# Patient Record
Sex: MALE | Race: WHITE | NOT HISPANIC OR LATINO | Employment: UNEMPLOYED | URBAN - METROPOLITAN AREA
[De-identification: names, ages, dates, MRNs, and addresses within clinical notes are randomized per-mention and may not be internally consistent; named-entity substitution may affect disease eponyms.]

---

## 2019-04-07 ENCOUNTER — HOSPITAL ENCOUNTER (EMERGENCY)
Facility: HOSPITAL | Age: 44
Discharge: HOME | End: 2019-04-08
Attending: EMERGENCY MEDICINE | Admitting: EMERGENCY MEDICINE
Payer: COMMERCIAL

## 2019-04-07 DIAGNOSIS — F41.9 ANXIETY: ICD-10-CM

## 2019-04-07 DIAGNOSIS — F22 PARANOID (HCC): Primary | ICD-10-CM

## 2019-04-07 DIAGNOSIS — F15.10 METHAMPHETAMINE ABUSE (HCC): ICD-10-CM

## 2019-04-07 PROBLEM — F20.9 SCHIZOPHRENIA (HCC): Status: ACTIVE | Noted: 2019-04-07

## 2019-04-07 LAB
ALBUMIN SERPL BCP-MCNC: 3.6 G/DL (ref 3.5–5)
ALP SERPL-CCNC: 77 U/L (ref 46–116)
ALT SERPL W P-5'-P-CCNC: 19 U/L (ref 12–78)
ANION GAP SERPL CALCULATED.3IONS-SCNC: 12 MMOL/L (ref 4–13)
AST SERPL W P-5'-P-CCNC: 24 U/L (ref 5–45)
BASOPHILS # BLD AUTO: 0.05 THOUSANDS/ΜL (ref 0–0.1)
BASOPHILS NFR BLD AUTO: 1 % (ref 0–1)
BILIRUB DIRECT SERPL-MCNC: 0.1 MG/DL (ref 0–0.2)
BILIRUB SERPL-MCNC: 0.6 MG/DL (ref 0.2–1)
BUN SERPL-MCNC: 18 MG/DL (ref 5–25)
CALCIUM SERPL-MCNC: 8.6 MG/DL (ref 8.3–10.1)
CHLORIDE SERPL-SCNC: 104 MMOL/L (ref 100–108)
CO2 SERPL-SCNC: 26 MMOL/L (ref 21–32)
CREAT SERPL-MCNC: 0.93 MG/DL (ref 0.6–1.3)
EOSINOPHIL # BLD AUTO: 0.02 THOUSAND/ΜL (ref 0–0.61)
EOSINOPHIL NFR BLD AUTO: 0 % (ref 0–6)
ERYTHROCYTE [DISTWIDTH] IN BLOOD BY AUTOMATED COUNT: 13.4 % (ref 11.6–15.1)
ETHANOL SERPL-MCNC: <3 MG/DL (ref 0–3)
GFR SERPL CREATININE-BSD FRML MDRD: 99 ML/MIN/1.73SQ M
GLUCOSE SERPL-MCNC: 107 MG/DL (ref 65–140)
HCT VFR BLD AUTO: 48.1 % (ref 36.5–49.3)
HGB BLD-MCNC: 15.8 G/DL (ref 12–17)
IMM GRANULOCYTES # BLD AUTO: 0.01 THOUSAND/UL (ref 0–0.2)
IMM GRANULOCYTES NFR BLD AUTO: 0 % (ref 0–2)
LYMPHOCYTES # BLD AUTO: 1.34 THOUSANDS/ΜL (ref 0.6–4.47)
LYMPHOCYTES NFR BLD AUTO: 19 % (ref 14–44)
MAGNESIUM SERPL-MCNC: 1.8 MG/DL (ref 1.6–2.6)
MCH RBC QN AUTO: 30.3 PG (ref 26.8–34.3)
MCHC RBC AUTO-ENTMCNC: 32.8 G/DL (ref 31.4–37.4)
MCV RBC AUTO: 92 FL (ref 82–98)
MONOCYTES # BLD AUTO: 0.82 THOUSAND/ΜL (ref 0.17–1.22)
MONOCYTES NFR BLD AUTO: 12 % (ref 4–12)
NEUTROPHILS # BLD AUTO: 4.74 THOUSANDS/ΜL (ref 1.85–7.62)
NEUTS SEG NFR BLD AUTO: 68 % (ref 43–75)
NRBC BLD AUTO-RTO: 0 /100 WBCS
PLATELET # BLD AUTO: 313 THOUSANDS/UL (ref 149–390)
PMV BLD AUTO: 10.5 FL (ref 8.9–12.7)
POTASSIUM SERPL-SCNC: 3.5 MMOL/L (ref 3.5–5.3)
PROT SERPL-MCNC: 7.4 G/DL (ref 6.4–8.2)
RBC # BLD AUTO: 5.22 MILLION/UL (ref 3.88–5.62)
SODIUM SERPL-SCNC: 142 MMOL/L (ref 136–145)
TROPONIN I SERPL-MCNC: <0.02 NG/ML
WBC # BLD AUTO: 6.98 THOUSAND/UL (ref 4.31–10.16)

## 2019-04-07 PROCEDURE — 84484 ASSAY OF TROPONIN QUANT: CPT | Performed by: EMERGENCY MEDICINE

## 2019-04-07 PROCEDURE — 80076 HEPATIC FUNCTION PANEL: CPT | Performed by: EMERGENCY MEDICINE

## 2019-04-07 PROCEDURE — 83735 ASSAY OF MAGNESIUM: CPT | Performed by: EMERGENCY MEDICINE

## 2019-04-07 PROCEDURE — 96374 THER/PROPH/DIAG INJ IV PUSH: CPT

## 2019-04-07 PROCEDURE — 80320 DRUG SCREEN QUANTALCOHOLS: CPT | Performed by: EMERGENCY MEDICINE

## 2019-04-07 PROCEDURE — 96375 TX/PRO/DX INJ NEW DRUG ADDON: CPT

## 2019-04-07 PROCEDURE — 99285 EMERGENCY DEPT VISIT HI MDM: CPT

## 2019-04-07 PROCEDURE — 96361 HYDRATE IV INFUSION ADD-ON: CPT

## 2019-04-07 PROCEDURE — 36415 COLL VENOUS BLD VENIPUNCTURE: CPT | Performed by: EMERGENCY MEDICINE

## 2019-04-07 PROCEDURE — 85025 COMPLETE CBC W/AUTO DIFF WBC: CPT | Performed by: EMERGENCY MEDICINE

## 2019-04-07 PROCEDURE — 96372 THER/PROPH/DIAG INJ SC/IM: CPT

## 2019-04-07 PROCEDURE — 93005 ELECTROCARDIOGRAM TRACING: CPT

## 2019-04-07 PROCEDURE — 80048 BASIC METABOLIC PNL TOTAL CA: CPT | Performed by: EMERGENCY MEDICINE

## 2019-04-07 RX ORDER — OLANZAPINE 10 MG/1
10 INJECTION, POWDER, LYOPHILIZED, FOR SOLUTION INTRAMUSCULAR ONCE
Status: COMPLETED | OUTPATIENT
Start: 2019-04-07 | End: 2019-04-07

## 2019-04-07 RX ORDER — ONDANSETRON 2 MG/ML
4 INJECTION INTRAMUSCULAR; INTRAVENOUS ONCE
Status: COMPLETED | OUTPATIENT
Start: 2019-04-07 | End: 2019-04-07

## 2019-04-07 RX ORDER — LORAZEPAM 2 MG/ML
2 INJECTION INTRAMUSCULAR ONCE
Status: DISCONTINUED | OUTPATIENT
Start: 2019-04-07 | End: 2019-04-07

## 2019-04-07 RX ORDER — DIPHENHYDRAMINE HYDROCHLORIDE 50 MG/ML
50 INJECTION INTRAMUSCULAR; INTRAVENOUS ONCE
Status: COMPLETED | OUTPATIENT
Start: 2019-04-07 | End: 2019-04-07

## 2019-04-07 RX ORDER — LORAZEPAM 2 MG/ML
2 INJECTION INTRAMUSCULAR ONCE
Status: COMPLETED | OUTPATIENT
Start: 2019-04-07 | End: 2019-04-07

## 2019-04-07 RX ADMIN — OLANZAPINE 10 MG: 10 INJECTION, POWDER, FOR SOLUTION INTRAMUSCULAR at 17:38

## 2019-04-07 RX ADMIN — SODIUM CHLORIDE 1000 ML: 0.9 INJECTION, SOLUTION INTRAVENOUS at 16:50

## 2019-04-07 RX ADMIN — ONDANSETRON 4 MG: 2 INJECTION INTRAMUSCULAR; INTRAVENOUS at 16:50

## 2019-04-07 RX ADMIN — DIPHENHYDRAMINE HYDROCHLORIDE 50 MG: 50 INJECTION, SOLUTION INTRAMUSCULAR; INTRAVENOUS at 16:50

## 2019-04-07 RX ADMIN — LORAZEPAM 2 MG: 2 INJECTION INTRAMUSCULAR; INTRAVENOUS at 17:41

## 2019-04-08 VITALS
TEMPERATURE: 99.1 F | WEIGHT: 200 LBS | HEART RATE: 100 BPM | SYSTOLIC BLOOD PRESSURE: 109 MMHG | OXYGEN SATURATION: 99 % | DIASTOLIC BLOOD PRESSURE: 68 MMHG | RESPIRATION RATE: 18 BRPM | BODY MASS INDEX: 29.53 KG/M2

## 2019-04-08 LAB
AMPHETAMINES SERPL QL SCN: POSITIVE
ATRIAL RATE: 102 BPM
BARBITURATES UR QL: NEGATIVE
BENZODIAZ UR QL: NEGATIVE
COCAINE UR QL: NEGATIVE
METHADONE UR QL: NEGATIVE
OPIATES UR QL SCN: NEGATIVE
P AXIS: 67 DEGREES
PCP UR QL: NEGATIVE
PR INTERVAL: 144 MS
QRS AXIS: 62 DEGREES
QRSD INTERVAL: 76 MS
QT INTERVAL: 338 MS
QTC INTERVAL: 440 MS
T WAVE AXIS: 58 DEGREES
THC UR QL: NEGATIVE
VENTRICULAR RATE: 102 BPM

## 2019-04-08 PROCEDURE — 80307 DRUG TEST PRSMV CHEM ANLYZR: CPT | Performed by: EMERGENCY MEDICINE

## 2019-04-08 PROCEDURE — 93010 ELECTROCARDIOGRAM REPORT: CPT | Performed by: INTERNAL MEDICINE

## 2019-05-15 ENCOUNTER — HOSPITAL ENCOUNTER (EMERGENCY)
Facility: HOSPITAL | Age: 44
Discharge: HOME | End: 2019-05-15
Attending: EMERGENCY MEDICINE | Admitting: EMERGENCY MEDICINE
Payer: COMMERCIAL

## 2019-05-15 ENCOUNTER — APPOINTMENT (EMERGENCY)
Dept: RADIOLOGY | Facility: HOSPITAL | Age: 44
End: 2019-05-15
Payer: COMMERCIAL

## 2019-05-15 VITALS
SYSTOLIC BLOOD PRESSURE: 127 MMHG | OXYGEN SATURATION: 98 % | TEMPERATURE: 99.3 F | HEART RATE: 135 BPM | RESPIRATION RATE: 24 BRPM | DIASTOLIC BLOOD PRESSURE: 79 MMHG

## 2019-05-15 DIAGNOSIS — R05.9 COUGH: Primary | ICD-10-CM

## 2019-05-15 PROCEDURE — 99283 EMERGENCY DEPT VISIT LOW MDM: CPT

## 2019-05-15 PROCEDURE — 71045 X-RAY EXAM CHEST 1 VIEW: CPT

## 2019-05-15 RX ORDER — BENZONATATE 100 MG/1
100 CAPSULE ORAL EVERY 8 HOURS
Qty: 21 CAPSULE | Refills: 0 | Status: SHIPPED | OUTPATIENT
Start: 2019-05-15 | End: 2019-07-04

## 2019-05-15 RX ORDER — BENZONATATE 100 MG/1
100 CAPSULE ORAL ONCE
Status: DISCONTINUED | OUTPATIENT
Start: 2019-05-15 | End: 2019-05-16 | Stop reason: HOSPADM

## 2019-06-29 ENCOUNTER — APPOINTMENT (EMERGENCY)
Dept: RADIOLOGY | Facility: HOSPITAL | Age: 44
End: 2019-06-29
Payer: COMMERCIAL

## 2019-06-29 ENCOUNTER — HOSPITAL ENCOUNTER (EMERGENCY)
Facility: HOSPITAL | Age: 44
Discharge: HOME | End: 2019-06-30
Attending: EMERGENCY MEDICINE | Admitting: EMERGENCY MEDICINE
Payer: COMMERCIAL

## 2019-06-29 DIAGNOSIS — F19.10 POLYSUBSTANCE ABUSE (HCC): Primary | ICD-10-CM

## 2019-06-29 LAB
ALBUMIN SERPL BCP-MCNC: 4.1 G/DL (ref 3.5–5)
ALP SERPL-CCNC: 79 U/L (ref 46–116)
ALT SERPL W P-5'-P-CCNC: 29 U/L (ref 12–78)
AMPHETAMINES SERPL QL SCN: POSITIVE
ANION GAP SERPL CALCULATED.3IONS-SCNC: 14 MMOL/L (ref 4–13)
ANION GAP SERPL CALCULATED.3IONS-SCNC: 9 MMOL/L (ref 4–13)
APAP SERPL-MCNC: <2 UG/ML (ref 10–20)
AST SERPL W P-5'-P-CCNC: 27 U/L (ref 5–45)
BARBITURATES UR QL: NEGATIVE
BASOPHILS # BLD AUTO: 0.06 THOUSANDS/ΜL (ref 0–0.1)
BASOPHILS NFR BLD AUTO: 0 % (ref 0–1)
BENZODIAZ UR QL: POSITIVE
BILIRUB SERPL-MCNC: 1 MG/DL (ref 0.2–1)
BUN SERPL-MCNC: 31 MG/DL (ref 5–25)
BUN SERPL-MCNC: 31 MG/DL (ref 5–25)
CALCIUM SERPL-MCNC: 8.3 MG/DL (ref 8.3–10.1)
CALCIUM SERPL-MCNC: 9 MG/DL (ref 8.3–10.1)
CHLORIDE SERPL-SCNC: 103 MMOL/L (ref 100–108)
CHLORIDE SERPL-SCNC: 108 MMOL/L (ref 100–108)
CK MB SERPL-MCNC: 2.6 NG/ML (ref 0–5)
CK MB SERPL-MCNC: <1 % (ref 0–2.5)
CK SERPL-CCNC: 558 U/L (ref 39–308)
CO2 SERPL-SCNC: 22 MMOL/L (ref 21–32)
CO2 SERPL-SCNC: 24 MMOL/L (ref 21–32)
COCAINE UR QL: NEGATIVE
CREAT SERPL-MCNC: 1.55 MG/DL (ref 0.6–1.3)
CREAT SERPL-MCNC: 1.92 MG/DL (ref 0.6–1.3)
EOSINOPHIL # BLD AUTO: 0.02 THOUSAND/ΜL (ref 0–0.61)
EOSINOPHIL NFR BLD AUTO: 0 % (ref 0–6)
ERYTHROCYTE [DISTWIDTH] IN BLOOD BY AUTOMATED COUNT: 13.3 % (ref 11.6–15.1)
ETHANOL SERPL-MCNC: <3 MG/DL (ref 0–3)
GFR SERPL CREATININE-BSD FRML MDRD: 41 ML/MIN/1.73SQ M
GFR SERPL CREATININE-BSD FRML MDRD: 53 ML/MIN/1.73SQ M
GLUCOSE SERPL-MCNC: 84 MG/DL (ref 65–140)
GLUCOSE SERPL-MCNC: 91 MG/DL (ref 65–140)
HCT VFR BLD AUTO: 46.6 % (ref 36.5–49.3)
HGB BLD-MCNC: 15.8 G/DL (ref 12–17)
IMM GRANULOCYTES # BLD AUTO: 0.07 THOUSAND/UL (ref 0–0.2)
IMM GRANULOCYTES NFR BLD AUTO: 0 % (ref 0–2)
LYMPHOCYTES # BLD AUTO: 1.11 THOUSANDS/ΜL (ref 0.6–4.47)
LYMPHOCYTES NFR BLD AUTO: 6 % (ref 14–44)
MCH RBC QN AUTO: 30.9 PG (ref 26.8–34.3)
MCHC RBC AUTO-ENTMCNC: 33.9 G/DL (ref 31.4–37.4)
MCV RBC AUTO: 91 FL (ref 82–98)
METHADONE UR QL: NEGATIVE
MONOCYTES # BLD AUTO: 1.19 THOUSAND/ΜL (ref 0.17–1.22)
MONOCYTES NFR BLD AUTO: 7 % (ref 4–12)
NEUTROPHILS # BLD AUTO: 15.38 THOUSANDS/ΜL (ref 1.85–7.62)
NEUTS SEG NFR BLD AUTO: 87 % (ref 43–75)
NRBC BLD AUTO-RTO: 0 /100 WBCS
OPIATES UR QL SCN: NEGATIVE
PCP UR QL: NEGATIVE
PLATELET # BLD AUTO: 360 THOUSANDS/UL (ref 149–390)
PMV BLD AUTO: 10.5 FL (ref 8.9–12.7)
POTASSIUM SERPL-SCNC: 3.9 MMOL/L (ref 3.5–5.3)
POTASSIUM SERPL-SCNC: 5.4 MMOL/L (ref 3.5–5.3)
PROT SERPL-MCNC: 7.3 G/DL (ref 6.4–8.2)
RBC # BLD AUTO: 5.12 MILLION/UL (ref 3.88–5.62)
SALICYLATES SERPL-MCNC: 4.2 MG/DL (ref 3–20)
SODIUM SERPL-SCNC: 139 MMOL/L (ref 136–145)
SODIUM SERPL-SCNC: 141 MMOL/L (ref 136–145)
THC UR QL: NEGATIVE
WBC # BLD AUTO: 17.83 THOUSAND/UL (ref 4.31–10.16)

## 2019-06-29 PROCEDURE — 85025 COMPLETE CBC W/AUTO DIFF WBC: CPT | Performed by: EMERGENCY MEDICINE

## 2019-06-29 PROCEDURE — 82553 CREATINE MB FRACTION: CPT | Performed by: EMERGENCY MEDICINE

## 2019-06-29 PROCEDURE — 96361 HYDRATE IV INFUSION ADD-ON: CPT

## 2019-06-29 PROCEDURE — 70450 CT HEAD/BRAIN W/O DYE: CPT

## 2019-06-29 PROCEDURE — 80307 DRUG TEST PRSMV CHEM ANLYZR: CPT | Performed by: EMERGENCY MEDICINE

## 2019-06-29 PROCEDURE — 93005 ELECTROCARDIOGRAM TRACING: CPT

## 2019-06-29 PROCEDURE — 80320 DRUG SCREEN QUANTALCOHOLS: CPT | Performed by: EMERGENCY MEDICINE

## 2019-06-29 PROCEDURE — 80053 COMPREHEN METABOLIC PANEL: CPT | Performed by: EMERGENCY MEDICINE

## 2019-06-29 PROCEDURE — 96372 THER/PROPH/DIAG INJ SC/IM: CPT

## 2019-06-29 PROCEDURE — 80329 ANALGESICS NON-OPIOID 1 OR 2: CPT | Performed by: EMERGENCY MEDICINE

## 2019-06-29 PROCEDURE — 96374 THER/PROPH/DIAG INJ IV PUSH: CPT

## 2019-06-29 PROCEDURE — 80048 BASIC METABOLIC PNL TOTAL CA: CPT | Performed by: EMERGENCY MEDICINE

## 2019-06-29 PROCEDURE — 82550 ASSAY OF CK (CPK): CPT | Performed by: EMERGENCY MEDICINE

## 2019-06-29 PROCEDURE — 99285 EMERGENCY DEPT VISIT HI MDM: CPT

## 2019-06-29 PROCEDURE — 36415 COLL VENOUS BLD VENIPUNCTURE: CPT | Performed by: EMERGENCY MEDICINE

## 2019-06-29 RX ORDER — MIDAZOLAM HYDROCHLORIDE 1 MG/ML
2 INJECTION INTRAMUSCULAR; INTRAVENOUS ONCE
Status: COMPLETED | OUTPATIENT
Start: 2019-06-29 | End: 2019-06-29

## 2019-06-29 RX ORDER — MIDAZOLAM HYDROCHLORIDE 1 MG/ML
2 INJECTION INTRAMUSCULAR; INTRAVENOUS ONCE
Status: DISCONTINUED | OUTPATIENT
Start: 2019-06-29 | End: 2019-06-29

## 2019-06-29 RX ORDER — DIPHENHYDRAMINE HYDROCHLORIDE 50 MG/ML
50 INJECTION INTRAMUSCULAR; INTRAVENOUS ONCE
Status: COMPLETED | OUTPATIENT
Start: 2019-06-29 | End: 2019-06-29

## 2019-06-29 RX ORDER — ZIPRASIDONE MESYLATE 20 MG/ML
20 INJECTION, POWDER, LYOPHILIZED, FOR SOLUTION INTRAMUSCULAR ONCE
Status: COMPLETED | OUTPATIENT
Start: 2019-06-29 | End: 2019-06-29

## 2019-06-29 RX ORDER — LORAZEPAM 2 MG/ML
2 INJECTION INTRAMUSCULAR ONCE
Status: COMPLETED | OUTPATIENT
Start: 2019-06-29 | End: 2019-06-29

## 2019-06-29 RX ORDER — LORAZEPAM 2 MG/ML
2 INJECTION INTRAMUSCULAR ONCE
Status: DISCONTINUED | OUTPATIENT
Start: 2019-06-29 | End: 2019-06-29

## 2019-06-29 RX ORDER — OLANZAPINE 10 MG/1
10 INJECTION, POWDER, LYOPHILIZED, FOR SOLUTION INTRAMUSCULAR ONCE
Status: COMPLETED | OUTPATIENT
Start: 2019-06-29 | End: 2019-06-29

## 2019-06-29 RX ADMIN — MIDAZOLAM 2 MG: 1 INJECTION INTRAMUSCULAR; INTRAVENOUS at 21:52

## 2019-06-29 RX ADMIN — ZIPRASIDONE MESYLATE 20 MG: 20 INJECTION, POWDER, LYOPHILIZED, FOR SOLUTION INTRAMUSCULAR at 16:57

## 2019-06-29 RX ADMIN — MIDAZOLAM 2 MG: 1 INJECTION INTRAMUSCULAR; INTRAVENOUS at 11:51

## 2019-06-29 RX ADMIN — LORAZEPAM 2 MG: 2 INJECTION INTRAMUSCULAR; INTRAVENOUS at 11:53

## 2019-06-29 RX ADMIN — OLANZAPINE 10 MG: 10 INJECTION, POWDER, FOR SOLUTION INTRAMUSCULAR at 11:51

## 2019-06-29 RX ADMIN — LORAZEPAM 2 MG: 2 INJECTION INTRAMUSCULAR; INTRAVENOUS at 16:30

## 2019-06-29 RX ADMIN — MIDAZOLAM HYDROCHLORIDE 2 MG: 1 INJECTION, SOLUTION INTRAMUSCULAR; INTRAVENOUS at 12:47

## 2019-06-29 RX ADMIN — DIPHENHYDRAMINE HYDROCHLORIDE 50 MG: 50 INJECTION, SOLUTION INTRAMUSCULAR; INTRAVENOUS at 11:51

## 2019-06-29 RX ADMIN — SODIUM CHLORIDE 1000 ML: 0.9 INJECTION, SOLUTION INTRAVENOUS at 15:25

## 2019-06-29 NOTE — ED NOTES
Pt got out of bed, swung to staff multiple times  Security at bedside  Pt escorted back to bed       Preston Gardiner RN  06/29/19 0222

## 2019-06-29 NOTE — ED NOTES
1120 - patient arrived combative and seeing snakes  Patient unable/unwilling to follow directs  Patient restrained with assistance from security and police for staff and patient safety  Verified restraints were applied correctly  Abrasions noted to patient's knees  1123 - patient medicated with Zyprexa, Ativan, and benadryl as ordered  Security and police assisted in holding patient for safety reasons  1151 - patient continues to thrash around and scream about snakes  Versed administered as ordered  1207 - patient has calmed  Appears to be sleeping  Will attempt to assess vitals at this time       Sherren Guadalajara, RN  06/29/19 5753

## 2019-06-29 NOTE — ED NOTES
Pt eating a lunch tray  Pt is awake and cooperative at this time       Pepper Boyd, RENAY  06/29/19 2317

## 2019-06-29 NOTE — ED NOTES
Pt still moving and trying to get out of restraints  Pt uncooperative       Nallely Hernandez RN  06/29/19 1912

## 2019-06-29 NOTE — ED NOTES
Pt attempting to get out of restraints  Pt still uncooperative at this time        Agus Nagy, RENAY  06/29/19 9948

## 2019-06-29 NOTE — ED NOTES
Pt still not able to follow directions, uncooperative at this time       Ileana Mercedes RN  06/29/19 8471

## 2019-06-29 NOTE — ED PROVIDER NOTES
History  No chief complaint on file  34-year-old male presents with police with aggression and agitation after using amphetamines  He is well known to the ED for similar behavior  No history provided by the patient because of his mental condition  No obvious head injury  History provided by:  Patient   used: No        None       No past medical history on file  No past surgical history on file  No family history on file  I have reviewed and agree with the history as documented  Social History     Tobacco Use    Smoking status: Not on file   Substance Use Topics    Alcohol use: Not on file    Drug use: Not on file        Review of Systems   All other systems reviewed and are negative  Physical Exam  Physical Exam   Constitutional: He appears well-developed and well-nourished  HENT:   Head: Normocephalic and atraumatic  Eyes: Pupils are equal, round, and reactive to light  EOM are normal    Neck: Normal range of motion  Neck supple  Cardiovascular: Normal rate and regular rhythm  Pulmonary/Chest: Effort normal and breath sounds normal    Abdominal: Soft  Bowel sounds are normal    Musculoskeletal: Normal range of motion  Neurological:   Patient is currently very aggressive and agitated moving all extremities verbally abusing  Skin: Skin is warm and dry  Nursing note and vitals reviewed  Vital Signs  ED Triage Vitals   Temp Pulse Resp BP SpO2   -- -- -- -- --      Temp src Heart Rate Source Patient Position - Orthostatic VS BP Location FiO2 (%)   -- -- -- -- --      Pain Score       --           There were no vitals filed for this visit        Visual Acuity      ED Medications  Medications - No data to display    Diagnostic Studies  Results Reviewed     None                 No orders to display              Procedures  ECG 12 Lead Documentation Only  Performed by: Lynda Guerrier DO  Authorized by: Lynda Guerrier DO     ECG reviewed by me, the ED Provider: yes    Patient location:  ED  Previous ECG:     Previous ECG:  Unavailable    Comparison to cardiac monitor: Yes    Interpretation:     Interpretation: non-specific    Rate:     ECG rate assessment: tachycardic    Rhythm:     Rhythm: sinus rhythm    Ectopy:     Ectopy: none    QRS:     QRS axis:  Normal  Conduction:     Conduction: normal    ST segments:     ST segments:  Non-specific  T waves:     T waves: non-specific             ED Course                               MDM  Number of Diagnoses or Management Options  Polysubstance abuse [facility]):   Diagnosis management comments: Patient evaluated with the labs  He was given multiple sedating medications and finally he was sleeping  Patient was still not directable and wobbly when he walked  At this time reassessment pending care transitioned to Dr Rogelio Brothers  Amount and/or Complexity of Data Reviewed  Clinical lab tests: ordered and reviewed  Tests in the radiology section of CPT®: ordered and reviewed  Tests in the medicine section of CPT®: ordered and reviewed    Patient Progress  Patient progress: stable      Disposition  Final diagnoses:   None     ED Disposition     None      Follow-up Information    None         Patient's Medications    No medications on file     No discharge procedures on file      ED Provider  Electronically Signed by           Carli Alvarado DO  07/04/19 4457

## 2019-06-30 VITALS
OXYGEN SATURATION: 99 % | TEMPERATURE: 97.2 F | DIASTOLIC BLOOD PRESSURE: 60 MMHG | RESPIRATION RATE: 20 BRPM | HEART RATE: 109 BPM | SYSTOLIC BLOOD PRESSURE: 119 MMHG

## 2019-06-30 NOTE — ED NOTES
Pt awake and asking to have restraints off  Explained to patient that he has to be able to relax and stop trying to bite his restraints off  Pt asked if there were any wolverines running around         Jason Medina RN  06/29/19 6775

## 2019-06-30 NOTE — ED NOTES
Pt awake and ambulated to and from the bathroom with steady gait         Alina Odell RN  06/30/19 5929

## 2019-06-30 NOTE — ED NOTES
Pt sleeping soundly  Repositions self  Respirations easy and unlabored    Remains on continuous observation     [employer], [facility]  06/29/19 0775

## 2019-06-30 NOTE — ED NOTES
Pt sleeping on his side    Remains on continuous observation     Avel Ortega, [facility]  06/30/19 2714

## 2019-06-30 NOTE — ED NOTES
Pt taken over to cat scan  Informed pt that we would be removing the restraints but that he needed to follow direction and lay still and not get violent with anyone  Pt stated "why don't you fuck off"  However, he kept his eyes closed and followed direction  Cat scan was able to be completed and when done pt rolled onto his side and is attempting to fall asleep    Restraints kept off at this time     Jerry Perla RN  06/29/19 6621

## 2019-07-03 ENCOUNTER — HOSPITAL ENCOUNTER (EMERGENCY)
Facility: HOSPITAL | Age: 44
Discharge: HOME | End: 2019-07-03
Attending: EMERGENCY MEDICINE | Admitting: EMERGENCY MEDICINE
Payer: COMMERCIAL

## 2019-07-03 VITALS
TEMPERATURE: 97 F | RESPIRATION RATE: 20 BRPM | SYSTOLIC BLOOD PRESSURE: 156 MMHG | OXYGEN SATURATION: 99 % | DIASTOLIC BLOOD PRESSURE: 100 MMHG | HEART RATE: 103 BPM

## 2019-07-03 DIAGNOSIS — R41.82 ALTERED MENTAL STATUS: Primary | ICD-10-CM

## 2019-07-03 DIAGNOSIS — Z00.00 NORMAL EXAM: ICD-10-CM

## 2019-07-03 LAB
ATRIAL RATE: 93 BPM
P AXIS: 73 DEGREES
PR INTERVAL: 154 MS
QRS AXIS: 74 DEGREES
QRSD INTERVAL: 80 MS
QT INTERVAL: 376 MS
QTC INTERVAL: 467 MS
T WAVE AXIS: 69 DEGREES
VENTRICULAR RATE: 93 BPM

## 2019-07-03 PROCEDURE — 99284 EMERGENCY DEPT VISIT MOD MDM: CPT

## 2019-07-03 PROCEDURE — 93010 ELECTROCARDIOGRAM REPORT: CPT | Performed by: INTERNAL MEDICINE

## 2019-07-03 NOTE — ED PROVIDER NOTES
History  Chief Complaint   Patient presents with    Altered Mental Status     Per ems pt was at people helping people at [facility] and pt was acting strange  Was not told how he was acting, pt upon arrival pt awake alert oriented x 3  pt not suicidal not homicidal      48-year-old male presents as bystanders found him to be altered mental status, where he was acting weird  Currently he is alert awake and oriented time place and person  He denies any complaints  History provided by:  Patient   used: No        Prior to Admission Medications   Prescriptions Last Dose Informant Patient Reported? Taking? FLUoxetine (PROzac) 20 mg capsule   Yes No   Sig: Take 20 mg by mouth daily  OLANZapine (ZyPREXA) 10 mg tablet   Yes No   Sig: Take 5 mg by mouth daily at bedtime  Facility-Administered Medications: None       Past Medical History:   Diagnosis Date    Multiple sclerosis ([facility] [address] )     Psychiatric disorder        Past Surgical History:   Procedure Laterality Date    HAND SURGERY         History reviewed  No pertinent family history  I have reviewed and agree with the history as documented  Social History     Tobacco Use    Smoking status: Current Every Day Smoker     Packs/day: 1 00    Smokeless tobacco: Never Used   Substance Use Topics    Alcohol use: No    Drug use: Yes     Types: Methamphetamines     Comment: sts he quit drugs        Review of Systems   All other systems reviewed and are negative  Physical Exam  Physical Exam   Constitutional: He is oriented to person, place, and time  He appears well-developed and well-nourished  HENT:   Head: Normocephalic and atraumatic  Eyes: Pupils are equal, round, and reactive to light  EOM are normal    Neck: Normal range of motion  Neck supple  Cardiovascular: Normal rate and regular rhythm  Pulmonary/Chest: Effort normal and breath sounds normal    Abdominal: Soft   Bowel sounds are normal    Musculoskeletal: Normal range of motion  Neurological: He is alert and oriented to person, place, and time  Skin: Skin is warm and dry  Psychiatric: He has a normal mood and affect  Nursing note and vitals reviewed  Vital Signs  ED Triage Vitals [07/03/19 1208]   Temperature Pulse Respirations Blood Pressure SpO2   (!) 97 °F (36 1 °C) 103 20 156/100 99 %      Temp Source Heart Rate Source Patient Position - Orthostatic VS BP Location FiO2 (%)   Tympanic Monitor Sitting Left arm --      Pain Score       --           Vitals:    07/03/19 1208   BP: 156/100   Pulse: 103   Patient Position - Orthostatic VS: Sitting         Visual Acuity      ED Medications  Medications - No data to display    Diagnostic Studies  Results Reviewed     None                 No orders to display              Procedures  Procedures       ED Course                               MDM  Number of Diagnoses or Management Options  Altered mental status:   Normal exam:   Diagnosis management comments: Patient discharged with appropriate instructions, and follow up  Patient verbalized understanding of instructions, had no further questions at the time of discharge  Patient had stable vital signs, and well appearing at discharge  Patient Progress  Patient progress: stable      Disposition  Final diagnoses: Altered mental status   Normal exam     Time reflects when diagnosis was documented in both MDM as applicable and the Disposition within this note     Time User Action Codes Description Comment    7/3/2019 12:12 PM Mattie Heart Add [R41 82] Altered mental status     7/3/2019 12:12 PM Mattie Heart Add [Z00 00] Normal exam       ED Disposition     ED Disposition Condition Date/Time Comment    Discharge Stable Wed Jul 3, 2019 12:12 PM Xiao Medrano discharge to home/self care              Follow-up Information     Follow up With Specialties Details Why Contact Info Additional Information    Jeffrey Martinez MD Family Medicine Schedule an appointment as soon as possible for a visit   [address]  [address] Emergency Department Emergency Medicine  If symptoms worsen [address] [facility] [address]  [phone] [facility], [place], [place], [address]          Discharge Medication List as of 7/3/2019 12:12 PM      CONTINUE these medications which have NOT CHANGED    Details   FLUoxetine (PROzac) 20 mg capsule Take 20 mg by mouth daily  , Until Discontinued, Historical Med      OLANZapine (ZyPREXA) 10 mg tablet Take 5 mg by mouth daily at bedtime  , Until Discontinued, Historical Med      benzonatate (TESSALON PERLES) 100 mg capsule Take 1 capsule (100 mg total) by mouth every 8 (eight) hours, Starting Wed 5/15/2019, Print           No discharge procedures on file      ED Provider  Electronically Signed by           Adriano Perea DO  07/04/19 6813

## 2019-07-04 ENCOUNTER — HOSPITAL ENCOUNTER (EMERGENCY)
Facility: HOSPITAL | Age: 44
Discharge: HOME | End: 2019-07-05
Attending: EMERGENCY MEDICINE | Admitting: EMERGENCY MEDICINE
Payer: COMMERCIAL

## 2019-07-04 DIAGNOSIS — F15.10 AMPHETAMINE USE DISORDER, MILD (HCC): Primary | ICD-10-CM

## 2019-07-04 LAB
ALBUMIN SERPL BCP-MCNC: 3.5 G/DL (ref 3.5–5)
ALP SERPL-CCNC: 74 U/L (ref 46–116)
ALT SERPL W P-5'-P-CCNC: 29 U/L (ref 12–78)
AMPHETAMINES SERPL QL SCN: POSITIVE
ANION GAP SERPL CALCULATED.3IONS-SCNC: 13 MMOL/L (ref 4–13)
APAP SERPL-MCNC: <2 UG/ML (ref 10–20)
AST SERPL W P-5'-P-CCNC: 27 U/L (ref 5–45)
BARBITURATES UR QL: NEGATIVE
BASOPHILS # BLD AUTO: 0.07 THOUSANDS/ΜL (ref 0–0.1)
BASOPHILS NFR BLD AUTO: 1 % (ref 0–1)
BENZODIAZ UR QL: POSITIVE
BILIRUB SERPL-MCNC: 0.7 MG/DL (ref 0.2–1)
BUN SERPL-MCNC: 20 MG/DL (ref 5–25)
CALCIUM SERPL-MCNC: 8.5 MG/DL (ref 8.3–10.1)
CHLORIDE SERPL-SCNC: 105 MMOL/L (ref 100–108)
CK MB SERPL-MCNC: 2.2 NG/ML (ref 0–5)
CK MB SERPL-MCNC: <1 % (ref 0–2.5)
CK SERPL-CCNC: 478 U/L (ref 39–308)
CO2 SERPL-SCNC: 24 MMOL/L (ref 21–32)
COCAINE UR QL: NEGATIVE
CREAT SERPL-MCNC: 1.68 MG/DL (ref 0.6–1.3)
EOSINOPHIL # BLD AUTO: 0.05 THOUSAND/ΜL (ref 0–0.61)
EOSINOPHIL NFR BLD AUTO: 1 % (ref 0–6)
ERYTHROCYTE [DISTWIDTH] IN BLOOD BY AUTOMATED COUNT: 13.4 % (ref 11.6–15.1)
ETHANOL SERPL-MCNC: <3 MG/DL (ref 0–3)
GFR SERPL CREATININE-BSD FRML MDRD: 48 ML/MIN/1.73SQ M
GLUCOSE SERPL-MCNC: 126 MG/DL (ref 65–140)
HCT VFR BLD AUTO: 43.1 % (ref 36.5–49.3)
HGB BLD-MCNC: 14.5 G/DL (ref 12–17)
IMM GRANULOCYTES # BLD AUTO: 0.03 THOUSAND/UL (ref 0–0.2)
IMM GRANULOCYTES NFR BLD AUTO: 0 % (ref 0–2)
LACTATE SERPL-SCNC: 1.4 MMOL/L (ref 0.5–2)
LACTATE SERPL-SCNC: 5.6 MMOL/L (ref 0.5–2)
LYMPHOCYTES # BLD AUTO: 1.12 THOUSANDS/ΜL (ref 0.6–4.47)
LYMPHOCYTES NFR BLD AUTO: 12 % (ref 14–44)
MAGNESIUM SERPL-MCNC: 1.8 MG/DL (ref 1.6–2.6)
MCH RBC QN AUTO: 30.9 PG (ref 26.8–34.3)
MCHC RBC AUTO-ENTMCNC: 33.6 G/DL (ref 31.4–37.4)
MCV RBC AUTO: 92 FL (ref 82–98)
METHADONE UR QL: NEGATIVE
MONOCYTES # BLD AUTO: 0.79 THOUSAND/ΜL (ref 0.17–1.22)
MONOCYTES NFR BLD AUTO: 8 % (ref 4–12)
NEUTROPHILS # BLD AUTO: 7.68 THOUSANDS/ΜL (ref 1.85–7.62)
NEUTS SEG NFR BLD AUTO: 78 % (ref 43–75)
NRBC BLD AUTO-RTO: 0 /100 WBCS
OPIATES UR QL SCN: NEGATIVE
PCP UR QL: NEGATIVE
PLATELET # BLD AUTO: 290 THOUSANDS/UL (ref 149–390)
PMV BLD AUTO: 10.7 FL (ref 8.9–12.7)
POTASSIUM SERPL-SCNC: 3.2 MMOL/L (ref 3.5–5.3)
PROT SERPL-MCNC: 6.8 G/DL (ref 6.4–8.2)
RBC # BLD AUTO: 4.7 MILLION/UL (ref 3.88–5.62)
SALICYLATES SERPL-MCNC: 3.7 MG/DL (ref 3–20)
SODIUM SERPL-SCNC: 142 MMOL/L (ref 136–145)
THC UR QL: NEGATIVE
WBC # BLD AUTO: 9.74 THOUSAND/UL (ref 4.31–10.16)

## 2019-07-04 PROCEDURE — 87040 BLOOD CULTURE FOR BACTERIA: CPT | Performed by: EMERGENCY MEDICINE

## 2019-07-04 PROCEDURE — 80307 DRUG TEST PRSMV CHEM ANLYZR: CPT | Performed by: EMERGENCY MEDICINE

## 2019-07-04 PROCEDURE — 82550 ASSAY OF CK (CPK): CPT | Performed by: EMERGENCY MEDICINE

## 2019-07-04 PROCEDURE — 96365 THER/PROPH/DIAG IV INF INIT: CPT

## 2019-07-04 PROCEDURE — 93005 ELECTROCARDIOGRAM TRACING: CPT

## 2019-07-04 PROCEDURE — 80329 ANALGESICS NON-OPIOID 1 OR 2: CPT | Performed by: EMERGENCY MEDICINE

## 2019-07-04 PROCEDURE — 96372 THER/PROPH/DIAG INJ SC/IM: CPT

## 2019-07-04 PROCEDURE — 80320 DRUG SCREEN QUANTALCOHOLS: CPT | Performed by: EMERGENCY MEDICINE

## 2019-07-04 PROCEDURE — 83735 ASSAY OF MAGNESIUM: CPT | Performed by: EMERGENCY MEDICINE

## 2019-07-04 PROCEDURE — 82553 CREATINE MB FRACTION: CPT | Performed by: EMERGENCY MEDICINE

## 2019-07-04 PROCEDURE — 96361 HYDRATE IV INFUSION ADD-ON: CPT

## 2019-07-04 PROCEDURE — 96375 TX/PRO/DX INJ NEW DRUG ADDON: CPT

## 2019-07-04 PROCEDURE — 99449 NTRPROF PH1/NTRNET/EHR 31/>: CPT | Performed by: EMERGENCY MEDICINE

## 2019-07-04 PROCEDURE — NC001 PR NO CHARGE: Performed by: EMERGENCY MEDICINE

## 2019-07-04 PROCEDURE — 80053 COMPREHEN METABOLIC PANEL: CPT | Performed by: EMERGENCY MEDICINE

## 2019-07-04 PROCEDURE — 99284 EMERGENCY DEPT VISIT MOD MDM: CPT

## 2019-07-04 PROCEDURE — 36415 COLL VENOUS BLD VENIPUNCTURE: CPT | Performed by: EMERGENCY MEDICINE

## 2019-07-04 PROCEDURE — 85025 COMPLETE CBC W/AUTO DIFF WBC: CPT | Performed by: EMERGENCY MEDICINE

## 2019-07-04 PROCEDURE — 83605 ASSAY OF LACTIC ACID: CPT | Performed by: EMERGENCY MEDICINE

## 2019-07-04 PROCEDURE — 96366 THER/PROPH/DIAG IV INF ADDON: CPT

## 2019-07-04 RX ORDER — ZIPRASIDONE MESYLATE 20 MG/ML
20 INJECTION, POWDER, LYOPHILIZED, FOR SOLUTION INTRAMUSCULAR ONCE
Status: COMPLETED | OUTPATIENT
Start: 2019-07-04 | End: 2019-07-04

## 2019-07-04 RX ORDER — MIDAZOLAM HYDROCHLORIDE 1 MG/ML
4 INJECTION INTRAMUSCULAR; INTRAVENOUS ONCE
Status: COMPLETED | OUTPATIENT
Start: 2019-07-04 | End: 2019-07-04

## 2019-07-04 RX ORDER — POTASSIUM CHLORIDE 14.9 MG/ML
20 INJECTION INTRAVENOUS ONCE
Status: COMPLETED | OUTPATIENT
Start: 2019-07-04 | End: 2019-07-04

## 2019-07-04 RX ORDER — DIPHENHYDRAMINE HYDROCHLORIDE 50 MG/ML
50 INJECTION INTRAMUSCULAR; INTRAVENOUS ONCE
Status: COMPLETED | OUTPATIENT
Start: 2019-07-04 | End: 2019-07-04

## 2019-07-04 RX ORDER — ACETAMINOPHEN 650 MG/1
650 SUPPOSITORY RECTAL ONCE
Status: DISCONTINUED | OUTPATIENT
Start: 2019-07-04 | End: 2019-07-04

## 2019-07-04 RX ORDER — DIAZEPAM 5 MG/ML
20 INJECTION, SOLUTION INTRAMUSCULAR; INTRAVENOUS ONCE
Status: COMPLETED | OUTPATIENT
Start: 2019-07-04 | End: 2019-07-04

## 2019-07-04 RX ORDER — DIAZEPAM 5 MG/ML
10 INJECTION, SOLUTION INTRAMUSCULAR; INTRAVENOUS ONCE
Status: DISCONTINUED | OUTPATIENT
Start: 2019-07-04 | End: 2019-07-04

## 2019-07-04 RX ADMIN — SODIUM CHLORIDE 1000 ML: 0.9 INJECTION, SOLUTION INTRAVENOUS at 17:48

## 2019-07-04 RX ADMIN — DIPHENHYDRAMINE HYDROCHLORIDE 50 MG: 50 INJECTION, SOLUTION INTRAMUSCULAR; INTRAVENOUS at 16:57

## 2019-07-04 RX ADMIN — ZIPRASIDONE MESYLATE 20 MG: 20 INJECTION, POWDER, LYOPHILIZED, FOR SOLUTION INTRAMUSCULAR at 16:57

## 2019-07-04 RX ADMIN — Medication 20 MG: at 20:42

## 2019-07-04 RX ADMIN — POTASSIUM CHLORIDE 20 MEQ: 200 INJECTION, SOLUTION INTRAVENOUS at 18:26

## 2019-07-04 RX ADMIN — SODIUM CHLORIDE 1000 ML: 0.9 INJECTION, SOLUTION INTRAVENOUS at 17:40

## 2019-07-04 RX ADMIN — MIDAZOLAM 4 MG: 1 INJECTION INTRAMUSCULAR; INTRAVENOUS at 16:57

## 2019-07-04 NOTE — RESTRAINT FACE TO FACE
Restraint Face to Face   Nay Wilkerson 38 y o  male MRN: [id number]  Unit/Bed#: ED CT1 Encounter: [id number]      Physical Evaluation aggressive  Purpose for Restraints/ Seclusion   Patient's reaction to the intervention  Patient's medical condition guarded  Patient's Behavioral condition aggressive  Restraints to be until patient sedated and directable

## 2019-07-04 NOTE — ED NOTES
Patient requested blanket and something to eat, given warm blanket and turkey sandwich per verbal order from Dr Nahomi Ingram, RN  07/04/19 9677

## 2019-07-04 NOTE — ED NOTES
ER requested PES contact EMILEE for this patient  Called CHINO / Tiffanie @ 19:30 - EMILEE request made  Keri Krabbe from [place] (and C) came and met with the patient who was known to her  Nike stopped back a few hours later and patient was still not arousable  She said she would call again on a few hours

## 2019-07-04 NOTE — CONSULTS
INTERPROFESSIONAL (PHONE) [facility] Toxicology  Mandy Mendoza 38 y o  male MRN: [id number]  Unit/Bed#: ED CT1 Encounter: [id number]      Reason for Consult / Principal Problem: methamphetamine abuse  Inpatient consult to Toxicology  Consult performed by: Jackson Bryan DO  Consult ordered by: Leisa Bergeron DO        07/04/19      ASSESSMENT:  38year-old male with sympathomimetic toxicity   1  Sympathomimetic toxicity   2  Hyperthermia, likely multifactorial from amphetamines and environmental exposure   3  Acute renal insufficiency   4  Acute agitation    RECOMMENDATIONS:  Please continue observation, supportive care and routine medical management  There is concern for amphetamine ingestion ("drug stuffing") as well as heat exposure, possibly while intoxicated  In the setting of drug stuffing that results in sympathomimetic toxicity, hyperthermia can be lethal and requires aggressive treatment  It is very reassuring that he responded so well to initial sedation and external cooling  If any concern that he ingested large amount of amphetamines the please consider over night observation  Otherwise, disposition pending further evaluation as appropriate per primary team      For further questions, please contact the medical  on call via [employer] or throughl the [employer] or [employer]  Please see additional teaching note below:      Management evidence based on:  Almas Reyez  Validation of a 6-hour observation period for cocaine body stuffers  Am J Emerg Med  2010;29(3):299-303  Hx and PE limited by the dynamics of a phone consultation  I have not personally interviewed or evaluated the patient, but only advised based on the information provided to me  Primary provider is responsible for all clinical decisions       Pertinent history, physical exam and clinical findings and course discussed: Mandy Mendoza is a 38 y o  year old male who presents with agitation, hyperthermia and history of amphetamine abuse  Review of systems and physical exam not performed by me  Historical Information   Past Medical History:   Diagnosis Date    Multiple sclerosis ([address] )     Psychiatric disorder      Past Surgical History:   Procedure Laterality Date    HAND SURGERY       Social History   Social History     Substance and Sexual Activity   Alcohol Use No     Social History     Substance and Sexual Activity   Drug Use Yes    Types: Methamphetamines    Comment: sts he quit drugs     Social History     Tobacco Use   Smoking Status Current Every Day Smoker    Packs/day: 1 00   Smokeless Tobacco Never Used     History reviewed  No pertinent family history  Prior to Admission medications    Medication Sig Start Date End Date Taking? Authorizing Provider   FLUoxetine (PROzac) 20 mg capsule Take 20 mg by mouth daily  Historical Provider, MD   OLANZapine (ZyPREXA) 10 mg tablet Take 5 mg by mouth daily at bedtime  Historical Provider, MD   benzonatate (TESSALON PERLES) 100 mg capsule Take 1 capsule (100 mg total) by mouth every 8 (eight) hours 5/15/19 7/4/19  Boris Griffith MD       Current Facility-Administered Medications   Medication Dose Route Frequency    diazepam (VALIUM) injection 20 mg  20 mg Intravenous Once    potassium chloride 20 mEq IVPB (premix)  20 mEq Intravenous Once    sodium chloride 0 9 % bolus 1,000 mL  1,000 mL Intravenous Once    sodium chloride 0 9 % bolus 1,000 mL  1,000 mL Intravenous Once       No Known Allergies    Objective     No intake or output data in the 24 hours ending 07/04/19 1839    Invasive Devices:   Peripheral IV 07/04/19 Right Forearm (Active)   Site Assessment Clean;Dry; Intact 7/4/2019  5:33 PM   Dressing Type Transparent 7/4/2019  5:33 PM   Line Status Blood return noted; Flushed;Saline locked 7/4/2019  5:33 PM   Dressing Status Clean;Dry; Intact 7/4/2019  5:33 PM       Vitals   Vitals: 07/04/19 1706 07/04/19 1800 07/04/19 1815 07/04/19 1830   BP: 152/85 114/69 119/76 119/82   TempSrc:   Tympanic    Pulse: (!) 139 (!) 114 94 78   Resp: (!) 24 22 18 15   Patient Position - Orthostatic VS:    Lying   Temp: (!) 103 6 °F (39 8 °C)  (!) 97 3 °F (36 3 °C)          EKG, Pathology, and/or Other Studies: Discussed ECG with ED physician who informed normal intervals      Lab Results: I have personally reviewed pertinent reports  Labs:  Results from last 7 days   Lab Units 07/04/19  1728   WBC Thousand/uL 9 74   HEMOGLOBIN g/dL 14 5   HEMATOCRIT % 43 1   PLATELETS Thousands/uL 290   NEUTROS PCT % 78*   LYMPHS PCT % 12*   MONOS PCT % 8      Results from last 7 days   Lab Units 07/04/19  1728   POTASSIUM mmol/L 3 2*   CHLORIDE mmol/L 105   CO2 mmol/L 24   BUN mg/dL 20   CREATININE mg/dL 1 68*   CALCIUM mg/dL 8 5   ALK PHOS U/L 74   ALT U/L 29   AST U/L 27   MAGNESIUM mg/dL 1 8          Results from last 7 days   Lab Units 07/04/19  1728   LACTIC ACID mmol/L 5 6*     0   Lab Value Date/Time    TROPONINI <0 02 04/07/2019 1649         Results from last 7 days   Lab Units 07/04/19  1728   ACETAMINOPHEN LVL ug/mL <2 0*   ETHANOL LVL mg/dL <3   SALICYLATE LVL mg/dL 3 7       Counseling / Coordination of Care  Total time spent today 36 minutes  This was a phone consultation

## 2019-07-04 NOTE — ED PROVIDER NOTES
History  No chief complaint on file  58-year-old male presents with agitation and aggression currently being restrained by multiple police officers  He is intoxicated and his usual choice of drug use amphetamines  He has been seen in the ED multiple times for the same complaint  [place] aim[place] being agitated  No obvious head injury noted  History provided by:  Patient   used: No        Cannot display prior to admission medications because the patient has not been admitted in this contact  Past Medical History:   Diagnosis Date    Multiple sclerosis ([facility] [address] )     Psychiatric disorder        Past Surgical History:   Procedure Laterality Date    HAND SURGERY         No family history on file  I have reviewed and agree with the history as documented  Social History     Tobacco Use    Smoking status: Current Every Day Smoker     Packs/day: 1 00   Substance Use Topics    Alcohol use: No    Drug use: Yes     Types: Methamphetamines        Review of Systems   All other systems reviewed and are negative  Physical Exam  Physical Exam   Constitutional: He appears well-developed and well-nourished  HENT:   Head: Normocephalic and atraumatic  Eyes: Pupils are equal, round, and reactive to light  EOM are normal    Neck: Normal range of motion  Neck supple  Cardiovascular: Normal rate and regular rhythm  Pulmonary/Chest: Effort normal and breath sounds normal    Abdominal: Soft  Bowel sounds are normal    Musculoskeletal: Normal range of motion  Neurological: He is alert  No gross neurological deficits   Skin: Skin is warm and dry  Psychiatric: He has a normal mood and affect  Nursing note and vitals reviewed        Vital Signs  ED Triage Vitals   Temp Pulse Resp BP SpO2   -- -- -- -- --      Temp src Heart Rate Source Patient Position - Orthostatic VS BP Location FiO2 (%)   -- -- -- -- --      Pain Score       --           There were no vitals filed for this visit  Visual Acuity      ED Medications  Medications - No data to display    Diagnostic Studies  Results Reviewed     None                 No orders to display              Procedures  ECG 12 Lead Documentation Only  Performed by: Carli Alvarado DO  Authorized by: Carli Alvarado DO     ECG reviewed by me, the ED Provider: yes    Patient location:  ED  Previous ECG:     Previous ECG:  Unavailable    Comparison to cardiac monitor: Yes    Interpretation:     Interpretation: abnormal    Rate:     ECG rate assessment: tachycardic    Rhythm:     Rhythm: sinus rhythm    Ectopy:     Ectopy: none    QRS:     QRS axis:  Normal  Conduction:     Conduction: normal    ST segments:     ST segments:  Non-specific  T waves:     T waves: non-specific             ED Course                               MDM  Number of Diagnoses or Management Options  Amphetamine use disorder, mild ([facility] [address] ):   Diagnosis management comments: Patient given Versed, Benadryl, and Geodon  He was sedated  Obtained toxicologic workup which was unremarkable except a urine drug screen which is positive for methamphetamines  Patient initially was febrile and tachycardic and had been out in the hot sun so I thought possible heat stroke so immediately started cooling measures on the patient  His temperature did come down to 97 3  I was on call with the  possibly attributing it to neuroleptics malignant syndrome and he recommended no antibiotics and Valium to calmed the patient down and eventually he would get better  Patient observed in the ED for multiple hours he did become calm however he was still agitated and could not walk properly  She was given an additional 20 mg of IV Valium and he is sleeping  Reassessment currently pending care transitioned to Dr Talavera         Amount and/or Complexity of Data Reviewed  Clinical lab tests: ordered and reviewed  Tests in the medicine section of CPT®: ordered and reviewed    Patient Progress  Patient progress: stable      Disposition  Final diagnoses:   None     ED Disposition     None      Follow-up Information    None         Patient's Medications   Discharge Prescriptions    No medications on file     No discharge procedures on file      ED Provider  Electronically Signed by           Carli Alvarado DO  07/04/19 2036

## 2019-07-05 VITALS
DIASTOLIC BLOOD PRESSURE: 88 MMHG | OXYGEN SATURATION: 100 % | SYSTOLIC BLOOD PRESSURE: 124 MMHG | TEMPERATURE: 97.3 F | BODY MASS INDEX: 31.38 KG/M2 | HEART RATE: 68 BPM | RESPIRATION RATE: 18 BRPM | HEIGHT: 67 IN | WEIGHT: 199.96 LBS

## 2019-07-05 NOTE — RESTRAINT FACE TO FACE
Restraint Face to Face   Mallory Paulino 38 y o  male MRN: [id number]  Unit/Bed#: ED 20 Encounter: [id number]      Physical Evaluation aggressive behavior  Purpose for Restraints/ Seclusion  Patient's reaction to the intervention  Patient's medical condition guarded  Patient's Behavioral condition guarded   Restraints to be

## 2019-07-05 NOTE — ED NOTES
Patient began to escalate, stated he wanted to leave and was trying to walk out of room  Patient gait unsteady, unable to respond appropiately to questions  Medications ordered by Dr Bella Stanton  Patient restrained by security during medication administration, patient calm after administration, restraints not indicated at this time       Mariusz Hoyt, RN  07/04/19 1721

## 2019-07-05 NOTE — ED NOTES
Patient states he is hungry again, given boxed lunch  This was patients third boxed lunch in 8 hours       Tomy Adams RN  07/05/19 5614

## 2019-07-06 LAB
ATRIAL RATE: 126 BPM
P AXIS: 64 DEGREES
PR INTERVAL: 138 MS
QRS AXIS: 61 DEGREES
QRSD INTERVAL: 86 MS
QT INTERVAL: 304 MS
QTC INTERVAL: 440 MS
T WAVE AXIS: 55 DEGREES
VENTRICULAR RATE: 126 BPM

## 2019-07-06 PROCEDURE — 93010 ELECTROCARDIOGRAM REPORT: CPT | Performed by: INTERNAL MEDICINE

## 2019-07-09 LAB
BACTERIA BLD CULT: NORMAL
BACTERIA BLD CULT: NORMAL

## 2019-07-09 NOTE — RESTRAINT FACE TO FACE
Restraint Face to Face   Ines Pierce 38 y o  male MRN: [id number]  Unit/Bed#: ED CT1 Encounter: [id number]      Physical Evaluation aggressive, verbally abusive, assaulting physically  Purpose for Restraints/ Seclusion:self harm and harm to others  Patient's reaction to the intervention : tolerated with difficulty  Patient's medical condition guarded  Patient's Behavioral condition guarded; aggressive  Restraints to be continued until patient is sedated and cooperative

## 2019-07-09 NOTE — RESTRAINT FACE TO FACE
Restraint Face to Face   Annita Thompson 38 y o  male MRN: [id number]  Unit/Bed#: ED CT1 Encounter: [id number]      Physical Evaluation patient is aggressive, and no directable, physically assaulting staff, verbally abusive  Purpose for Restraints/ Seclusion  : protection from self harm, and harm to others  Patient's reaction to the intervention tolerated it  Patient's medical condition guarded  Patient's Behavioral condition guarded, aggressive  Restraints to be  Continued until patient is sedated

## 2021-03-19 ENCOUNTER — TELEPHONE (OUTPATIENT)
Dept: NEUROLOGY | Facility: CLINIC | Age: 46
End: 2021-03-19

## 2021-03-19 ENCOUNTER — OFFICE VISIT (OUTPATIENT)
Dept: FAMILY MEDICINE CLINIC | Facility: CLINIC | Age: 46
End: 2021-03-19
Payer: COMMERCIAL

## 2021-03-19 VITALS
DIASTOLIC BLOOD PRESSURE: 86 MMHG | HEIGHT: 76 IN | SYSTOLIC BLOOD PRESSURE: 128 MMHG | TEMPERATURE: 98.6 F | WEIGHT: 230 LBS | HEART RATE: 107 BPM | BODY MASS INDEX: 28.01 KG/M2 | OXYGEN SATURATION: 97 % | RESPIRATION RATE: 16 BRPM

## 2021-03-19 DIAGNOSIS — F20.0 PARANOID SCHIZOPHRENIA (HCC): ICD-10-CM

## 2021-03-19 DIAGNOSIS — F19.11 HISTORY OF DRUG ABUSE (HCC): ICD-10-CM

## 2021-03-19 DIAGNOSIS — Z00.00 ANNUAL PHYSICAL EXAM: ICD-10-CM

## 2021-03-19 DIAGNOSIS — Z72.0 TOBACCO ABUSE: ICD-10-CM

## 2021-03-19 DIAGNOSIS — F15.10 AMPHETAMINE USE DISORDER, MILD (HCC): ICD-10-CM

## 2021-03-19 DIAGNOSIS — Z00.00 ENCOUNTER FOR MEDICAL EXAMINATION TO ESTABLISH CARE: Primary | ICD-10-CM

## 2021-03-19 DIAGNOSIS — G35 MULTIPLE SCLEROSIS (HCC): ICD-10-CM

## 2021-03-19 PROCEDURE — 3725F SCREEN DEPRESSION PERFORMED: CPT | Performed by: FAMILY MEDICINE

## 2021-03-19 PROCEDURE — 3008F BODY MASS INDEX DOCD: CPT | Performed by: FAMILY MEDICINE

## 2021-03-19 PROCEDURE — 99386 PREV VISIT NEW AGE 40-64: CPT | Performed by: FAMILY MEDICINE

## 2021-03-19 RX ORDER — ARIPIPRAZOLE 10 MG/1
10 TABLET ORAL DAILY
COMMUNITY

## 2021-03-19 NOTE — PROGRESS NOTES
[address] [facility]ACT[facility]    NAME: Latrell Espinosa  AGE: 54 y o  SEX: male  : 1975     DATE: 3/19/2021     Assessment and Plan:     Problem List Items Addressed This Visit        Other    Schizophrenia ([facility] [address] )    Relevant Medications    ARIPiprazole (ABILIFY) 10 mg tablet    Amphetamine use disorder, mild (HCC)    Relevant Medications    ARIPiprazole (ABILIFY) 10 mg tablet    Tobacco abuse      Other Visit Diagnoses     Encounter for medical examination to establish care    -  Primary    Multiple sclerosis ([facility] )        History of drug abuse ([facility] )            MS:   Diagnosed more than 15 years ago  Has no symptoms at this time  Has not seen a neurologist since   MRI at that time showed multiple white   Matter enhancing lesions  Has been on Rebif in the past but not currently on any maintenance medication  Given for referral for neurology today     tobacco abuse   History of greater than 20 pack years  Not interested in quitting today  Has not tolerated multiple types of nicotine therapies in the past       History of drug abuse   History of methamphetamine and heroin use in the past denies any IV drug needle use or sharing  Immunizations and preventive care screenings were discussed with patient today  Appropriate education was printed on patient's after visit summary  Counseling:  Alcohol/drug use: discussed moderation in alcohol intake, the recommendations for healthy alcohol use, and avoidance of illicit drug use  Dental Health: discussed importance of regular tooth brushing, flossing, and dental visits  Injury prevention: discussed safety/seat belts, safety helmets, smoke detectors, carbon dioxide detectors, and smoking near bedding or upholstery  Sexual health: discussed sexually transmitted diseases, partner selection, use of condoms, avoidance of unintended pregnancy, and contraceptive alternatives    · Exercise: the importance of regular exercise/physical activity was discussed  Recommend exercise 3-5 times per week for at least 30 minutes  BMI Counseling: Body mass index is 28 kg/m²  The BMI is above normal  Nutrition recommendations include encouraging healthy choices of fruits and vegetables  Exercise recommendations include moderate physical activity 150 minutes/week  No pharmacotherapy was ordered  BMI Counseling: Body mass index is 28 kg/m²  Follow-up plan was not completed due to patient refusing BMI follow-up plan  No follow-ups on file  Chief Complaint:     Chief Complaint   Patient presents with    [facility]     disability, needs neuro referral for multiple sclerosis      History of Present Illness:     Adult Annual Physical   Patient here for a comprehensive physical exam  The patient reports problems - MS as above  Diet and Physical Activity  · Diet/Nutrition: poor diet  · Exercise: no formal exercise  Depression Screening  PHQ-9 Depression Screening    PHQ-9:   Frequency of the following problems over the past two weeks:      Little interest or pleasure in doing things: 1 - several days  Feeling down, depressed, or hopeless: 1 - several days  PHQ-2 Score: 2       General Health  · Sleep: sleeps well  · Hearing: normal - bilateral   · Vision: no vision problems  · Dental: no dental visits for >1 year   Health  · Symptoms include: none     Review of Systems:     Review of Systems   All other systems reviewed and are negative       Past Medical History:     Past Medical History:   Diagnosis Date    Multiple sclerosis ([facility] )     Psychiatric disorder       Past Surgical History:     Past Surgical History:   Procedure Laterality Date    HAND SURGERY        Family History:     Family History   Problem Relation Age of Onset    Diabetes Mother     Crohn's disease Father       Social History:        Social History     Socioeconomic History    Marital status: Single     Spouse name: None    Number of children: None    Years of education: None    Highest education level: None   Occupational History    None   Social Needs    Financial resource strain: None    Food insecurity     Worry: None     Inability: None    Transportation needs     Medical: None     Non-medical: None   Tobacco Use    Smoking status: Current Every Day Smoker     Packs/day: 0 50     Types: Cigarettes    Smokeless tobacco: Current User   Substance and Sexual Activity    Alcohol use: No    Drug use: Not Currently     Comment: sts he quit drugs    Sexual activity: None   Lifestyle    Physical activity     Days per week: None     Minutes per session: None    Stress: None   Relationships    Social connections     Talks on phone: None     Gets together: None     Attends Islam service: None     Active member of club or organization: None     Attends meetings of clubs or organizations: None     Relationship status: None    Intimate partner violence     Fear of current or ex partner: None     Emotionally abused: None     Physically abused: None     Forced sexual activity: None   Other Topics Concern    None   Social History Narrative    ** Merged History Encounter **           Current Medications:     Current Outpatient Medications   Medication Sig Dispense Refill    ARIPiprazole (ABILIFY) 10 mg tablet Take 10 mg by mouth daily      FLUoxetine (PROzac) 20 mg capsule Take 20 mg by mouth daily   OLANZapine (ZyPREXA) 10 mg tablet Take 5 mg by mouth daily at bedtime  No current facility-administered medications for this visit  Allergies:     No Known Allergies   Physical Exam:     /86 (BP Location: Left arm, Patient Position: Sitting, Cuff Size: Adult)   Pulse (!) 107   Temp 98 6 °F (37 °C) (Tympanic)   Resp 16   Ht 6' 4" (1 93 m)   Wt 104 kg (230 lb)   SpO2 97%   BMI 28 00 kg/m²     Physical Exam  Vitals signs reviewed  Constitutional:       Appearance: Normal appearance  HENT:      Head: Normocephalic and atraumatic  Right Ear: Tympanic membrane, ear canal and external ear normal       Left Ear: Tympanic membrane, ear canal and external ear normal       Nose: Nose normal       Mouth/Throat:      Mouth: Mucous membranes are moist       Pharynx: Oropharynx is clear  Eyes:      Pupils: Pupils are equal, round, and reactive to light  Cardiovascular:      Pulses: Normal pulses  Heart sounds: Normal heart sounds  Pulmonary:      Effort: Pulmonary effort is normal       Breath sounds: Normal breath sounds  Abdominal:      General: Abdomen is flat  Palpations: Abdomen is soft  Musculoskeletal: Normal range of motion  Skin:     General: Skin is warm and dry  Capillary Refill: Capillary refill takes less than 2 seconds  Neurological:      General: No focal deficit present  Mental Status: He is alert and oriented to person, place, and time  Cranial Nerves: No cranial nerve deficit     Psychiatric:      Comments: Agitated            Magy Troncoso MD  [address] [place] [address]

## 2021-03-19 NOTE — TELEPHONE ENCOUNTER
Best contact number for patient:  [phone]   Emergency Contact name and number:    Referring provider and telephone number:    Primary Care Provider Name and if affiliated with [facility]:      Reason for Appointment/Dx: MS     Have you seen and followed up with a pediatric Neurologist for this disease in the past?  No (If yes ok to schedule with Dr Leopold Asper)    Neurology Location patient would like to be seen:    Order received? Yes                                                 Records Received? Yes    Have you ever seen another Neurologist?       No    Insurance Information    Insurance Name:    ID/Policy #:    Secondary Insurance:    ID/Policy#: Workman's Comp/ Accident/ School  Information      Workman's Comp/Accident/School related?        No    If yes name of Insurance company:    Date of Injury:    Type of Injury:    [address] Name and Telephone Number:    Notes:                   Appointment date: 05/12/2021

## 2021-03-26 ENCOUNTER — TELEPHONE (OUTPATIENT)
Dept: NEUROLOGY | Facility: CLINIC | Age: 46
End: 2021-03-26

## 2021-03-26 NOTE — TELEPHONE ENCOUNTER
THE [facility] asking for CB to RS appt on 5/12/21 with Dr Hakeem Galindo  Gave my direct number in [place]

## 2021-05-25 ENCOUNTER — RECORDS - HEALTHEAST (OUTPATIENT)
Dept: ADMINISTRATIVE | Facility: CLINIC | Age: 46
End: 2021-05-25

## 2021-05-25 ENCOUNTER — OFFICE VISIT (OUTPATIENT)
Dept: NEUROLOGY | Facility: CLINIC | Age: 46
End: 2021-05-25
Payer: COMMERCIAL

## 2021-05-25 VITALS
DIASTOLIC BLOOD PRESSURE: 82 MMHG | HEART RATE: 103 BPM | WEIGHT: 224 LBS | HEIGHT: 76 IN | BODY MASS INDEX: 27.28 KG/M2 | SYSTOLIC BLOOD PRESSURE: 117 MMHG

## 2021-05-25 DIAGNOSIS — F31.70 HISTORY OF DEPRESSED BIPOLAR DISORDER (HCC): ICD-10-CM

## 2021-05-25 DIAGNOSIS — G24.9 DYSKINESIA: ICD-10-CM

## 2021-05-25 DIAGNOSIS — F25.9 SCHIZOPHRENIA, SCHIZO-AFFECTIVE (HCC): ICD-10-CM

## 2021-05-25 DIAGNOSIS — G35 MULTIPLE SCLEROSIS (HCC): Primary | ICD-10-CM

## 2021-05-25 PROCEDURE — 3008F BODY MASS INDEX DOCD: CPT | Performed by: PSYCHIATRY & NEUROLOGY

## 2021-05-25 PROCEDURE — 99244 OFF/OP CNSLTJ NEW/EST MOD 40: CPT | Performed by: PSYCHIATRY & NEUROLOGY

## 2021-05-25 NOTE — PROGRESS NOTES
Outpatient Neurology History and Physical  Macarena Guardado  [id number]  20 y o   1975          Consult: Yes    Nettie Sosa MD      Chief Complaint   Patient presents with    Multiple Sclerosis     dx 15+ years ago, no MRI since 2016           History Obtained from: patient     HPI:     Macarena Guardado is a 50 yo M with PMH of MS presents to establish care  Patient was dx with MS in [place] about 15 years ago  It has started out as right facial numbness  Denies any other symptom  Patient says he was on Rebiff for sometime but it was making him sick so stopped it  Patient was not seeing neurologist regularly  Patient doesn't have much physical limitations  He can't climb ladder  He walks independently and drives  He lives independently  Denies any bowel or bladder loss of control  Denies saddle anesthesia  He will occasionally get numbness in feet but his sxs are tolerable  Patient was seen by Dr Myrtle Tracey  Last time he was seen was in 2016  The reason why he's here today is to get MS included in his disability paperwork  He is asked for updated MRI  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, 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  He hasn't been working for past 2 years  He is dx with schizoaffective, bipolar disorder  Past Medical History:   Diagnosis Date    Multiple sclerosis ([facility] )     Psychiatric disorder                Current Outpatient Medications on File Prior to Visit   Medication Sig Dispense Refill    cariprazine (Vraylar) 1 5 MG capsule Take 1 5 mg by mouth daily      ARIPiprazole (ABILIFY) 10 mg tablet Take 10 mg by mouth daily      FLUoxetine (PROzac) 20 mg capsule Take 20 mg by mouth daily   OLANZapine (ZyPREXA) 10 mg tablet Take 5 mg by mouth daily at bedtime  No current facility-administered medications on file prior to visit          No Known Allergies      Family History   Problem Relation Age of Onset    Diabetes Mother     Crohn's disease Father Past Surgical History:   Procedure Laterality Date    HAND SURGERY             Social History     Socioeconomic History    Marital status: Single     Spouse name: Not on file    Number of children: Not on file    Years of education: Not on file    Highest education level: Not on file   Occupational History    Not on file   Social Needs    Financial resource strain: Not on file    Food insecurity     Worry: Not on file     Inability: Not on file    Transportation needs     Medical: Not on file     Non-medical: Not on file   Tobacco Use    Smoking status: Current Every Day Smoker     Packs/day: 0 50     Types: Cigarettes    Smokeless tobacco: Current User   Substance and Sexual Activity    Alcohol use: No    Drug use: Not Currently     Comment: sts he quit drugs    Sexual activity: Not on file   Lifestyle    Physical activity     Days per week: Not on file     Minutes per session: Not on file    Stress: Not on file   Relationships    Social connections     Talks on phone: Not on file     Gets together: Not on file     Attends Advent service: Not on file     Active member of club or organization: Not on file     Attends meetings of clubs or organizations: Not on file     Relationship status: Not on file    Intimate partner violence     Fear of current or ex partner: Not on file     Emotionally abused: Not on file     Physically abused: Not on file     Forced sexual activity: Not on file   Other Topics Concern    Not on file   Social History Narrative    ** Merged History Encounter **            Review of Systems  Refer to positive review of systems in HPI  Constitutional- No fever  Eyes- No visual change  ENT- Hearing normal  CV- No chest pain  Resp- No Shortness of breath  GI- No diarrhea  - Bladder normal  MS- No Arthritis   Skin- No rash  Psych- No depression  Endo- No DM  Heme- No nodes    PHYSICAL EXAM:    Vitals:    05/25/21 1507   BP: 117/82   BP Location: Left arm Patient Position: Sitting   Cuff Size: Adult   Pulse: 103   Weight: 102 kg (224 lb)   Height: 6' 4" (1 93 m)         Appearance: No Acute Distress  Ophthalmoscopic: Disc Flat, Normal fundus  Carotid/Heart/Peripheral Vascular: No Bruits, RRR  Orientation: Awake, Alert, and Oriented x 3  Mental status:  Memory: Registation 3/3 Recall 3/3  Attention: Normal  Knowledge: Appropriate  Language: No aphasia  Speech: No dysarthria  Cranial Nerves:  2 No Visual Defect on Confrontation; Pupils round, equal, reactive to light  3,4,6 Extraocular Movements Intact; no nystagmus  5 Facial Sensation Intact  7 No facial asymmetry  8 Intact hearing  9,10 Palate symmetric, normal gag  11 Good shoulder shrug  12 Tongue Midline  Gait: Stable, No ataxia, slight difficulty with tandem walking  Coordination: No ataxia with finger to nose testing and heel to shin testing  Sensory: Intact, Symmetric to Pinprick, Light Touch, Vibration, and Joint Position  Muscle Tone: Normal  Muscle exam  Arm Right Left Leg Right Left   Deltoid 5/5 5/5 Iliopsoas 5/5 5/5   Biceps 5/5 5/5 Quads 5/5 5/5   Triceps 5/5 5/5 Hamstrings 5/5 5/5   Wrist Extension 5/5 5/5 Ankle Dorsi Flexion 5/5 5/5   Wrist Flexion 5/5 5/5 Ankle Plantar Flexion 5/5 5/5   Interossei 5/5 5/5 Ankle Eversion 5/5 5/5   APB 5/5 5/5 Ankle Inversion 5/5 5/5       Reflexes   RJ BJ TJ KJ AJ Plantars Subramanian's   Right 2+ 2+ 2+ 2+ 2+ Downgoing Not present   Left 2+ 2+ 2+ 2+ 2+ Downgoing Not present     Generalized dyskinesia-mild     Personal review of         Mri brain MS protocol       Assessment/Plan:     1  Multiple sclerosis ([facility] )  MRI brain MS wo and w contrast   2  Schizophrenia, schizo-affective ([address] )     3  History of depressed bipolar disorder ([facility] )     4  Dyskinesia         Patient has remained stable in terms of MS  He's independently functional  His underlying psychiatric conditions could be from h/o MS as they coincide  Will get f/u MRI brain    Patient says if repeat imaging is concerning then he's willing to take DMD but he's honest that he may or may not be compliant  He has had tics all of his life  Some of psych meds worsen them  Counseling Documentation:  The patient and/or patient's family were  counseled regarding diagnostic results  Instructions for management,risk factor reductions,prognosis of disease were discussed  Patient and family were educated regarding impressions,risks and benefits of treatment options,importance of compliance with treatment  Total time of encounter: 50 min  More than 50% of time was spent in counseling and coordination of care of patient  PATRICK Wills [facility]  [address]  [address]

## 2021-06-28 ENCOUNTER — HOSPITAL ENCOUNTER (OUTPATIENT)
Dept: RADIOLOGY | Facility: HOSPITAL | Age: 46
Discharge: HOME | End: 2021-06-28
Attending: PSYCHIATRY & NEUROLOGY
Payer: COMMERCIAL

## 2021-06-28 DIAGNOSIS — G35 MULTIPLE SCLEROSIS (HCC): ICD-10-CM

## 2021-06-28 PROCEDURE — 70553 MRI BRAIN STEM W/O & W/DYE: CPT

## 2021-06-28 PROCEDURE — A9585 GADOBUTROL INJECTION: HCPCS | Performed by: PSYCHIATRY & NEUROLOGY

## 2021-06-28 PROCEDURE — G1004 CDSM NDSC: HCPCS

## 2021-06-28 RX ADMIN — GADOBUTROL 10 ML: 604.72 INJECTION INTRAVENOUS at 11:29

## 2021-07-06 ENCOUNTER — TELEPHONE (OUTPATIENT)
Dept: NEUROLOGY | Facility: CLINIC | Age: 46
End: 2021-07-06

## 2021-07-06 NOTE — TELEPHONE ENCOUNTER
----- Message from Kareen Ramey MD sent at 7/2/2021  2:38 PM EDT -----  I called the number on file about results but it's not accepting any calls  Can we reach him or write a letter to contact us for sooner appointment and consideration of iv solumedrol ?

## 2021-07-06 NOTE — LETTER
July 6, 2021          Edwin Hollis  [address] [facility] [address]    Dear Mr Goldstein Viv:    Please contact our office in regards to your latest MRI results at [phone]      Thank you,  [facility]

## 2021-07-09 NOTE — TELEPHONE ENCOUNTER
I called the patient again to see if his number was back in service, and it still states "not able to receive calls"  I did send a letter to him on 7/6 so hopefully he will respond  Thanks

## 2021-07-19 NOTE — TELEPHONE ENCOUNTER
The patient called in today after receiving the letter  He has a new phone number which I updated in his chart    He would like an appointment first to discuss treatment options (Solumedrol, etc)

## 2021-07-26 ENCOUNTER — TELEPHONE (OUTPATIENT)
Dept: NEUROLOGY | Facility: CLINIC | Age: 46
End: 2021-07-26

## 2021-07-26 NOTE — TELEPHONE ENCOUNTER
Lety Santiago [facility] for patient to [facility] and confirm appointment with Dr Mt Eason  Gave direct numbers in [place]

## 2021-07-28 ENCOUNTER — OFFICE VISIT (OUTPATIENT)
Dept: NEUROLOGY | Facility: CLINIC | Age: 46
End: 2021-07-28
Payer: COMMERCIAL

## 2021-07-28 VITALS
DIASTOLIC BLOOD PRESSURE: 78 MMHG | SYSTOLIC BLOOD PRESSURE: 116 MMHG | HEART RATE: 94 BPM | BODY MASS INDEX: 28.25 KG/M2 | HEIGHT: 76 IN | WEIGHT: 232 LBS

## 2021-07-28 DIAGNOSIS — F17.200 SMOKING: ICD-10-CM

## 2021-07-28 DIAGNOSIS — G35 MULTIPLE SCLEROSIS (HCC): Primary | ICD-10-CM

## 2021-07-28 DIAGNOSIS — F99 PSYCHIATRIC DISORDER: ICD-10-CM

## 2021-07-28 DIAGNOSIS — G24.01 DRUG-INDUCED DYSKINESIA: ICD-10-CM

## 2021-07-28 PROCEDURE — 3008F BODY MASS INDEX DOCD: CPT | Performed by: PSYCHIATRY & NEUROLOGY

## 2021-07-28 PROCEDURE — 99215 OFFICE O/P EST HI 40 MIN: CPT | Performed by: PSYCHIATRY & NEUROLOGY

## 2021-07-28 RX ORDER — DIMETHYL FUMARATE 240 MG/1
240 CAPSULE ORAL 2 TIMES DAILY
Qty: 60 CAPSULE | Refills: 3 | Status: SHIPPED | OUTPATIENT
Start: 2021-07-28 | End: 2021-10-11

## 2021-07-28 RX ORDER — DIMETHYL FUMARATE 120 MG/1
120 CAPSULE ORAL 2 TIMES DAILY
Qty: 28 CAPSULE | Refills: 0 | Status: SHIPPED | OUTPATIENT
Start: 2021-07-28 | End: 2021-08-10

## 2021-07-28 RX ORDER — POLYETHYLENE GLYCOL 3350 17 G
4 POWDER IN PACKET (EA) ORAL AS NEEDED
Qty: 100 EACH | Refills: 0 | Status: SHIPPED | OUTPATIENT
Start: 2021-07-28 | End: 2021-08-12

## 2021-07-28 NOTE — PROGRESS NOTES
Return NeuroOutpatient Note        Bright Dillard  [id number]  89 y o   1975       Discuss MRI        History obtained from:  Patient     HPI/Subjective:    Bright Dillard is a 52 yo M with PMH of MS presents as f/u  He has known about MS since at least  2010  Patient was dx with MS in [place] about 15 years ago  It had started out as right facial numbness  Per my previous history, patient  was on Rebiff for sometime but it was making him sick so stopped it  Patient was not seeing neurologist regularly  Patient doesn't have much physical limitations  He can't climb ladder  He walks independently and drives  He lives independently  Denies any bowel or bladder loss of control  Denies saddle anesthesia  He will occasionally get numbness in feet but his sxs are tolerable  At initial visit, he wanted updated MRI brain  His recent mri brain revealed new white matter lesion in the right frontal centrum semiovale with associated enhancement, indicative of an actively demyelinating focus in this patient with history of multiple sclerosis  We tried reaching him but he had wrong number in system  After he received our letter he contacted us         He is dx with schizoaffective, bipolar disorder         Past Medical History:   Diagnosis Date    [facility]     Psychiatric disorder      Social History     Socioeconomic History    Marital status: Single     Spouse name: Not on file    Number of children: Not on file    Years of education: Not on file    Highest education level: Not on file   Occupational History    Not on file   Tobacco Use    Smoking status: Current Every Day Smoker     Packs/day: 0 50     Types: Cigarettes    Smokeless tobacco: Current User   Substance and Sexual Activity    Alcohol use: No    Drug use: Not Currently     Comment: sts he quit drugs    Sexual activity: Not on file   Other Topics Concern    Not on file   Social History Narrative    ** Merged History Encounter **          Social Determinants of Health     Financial Resource Strain:     Difficulty of Paying Living Expenses:    Food Insecurity:     Worried About Running Out of Food in the Last Year:     [address] in the Last Year:    Transportation Needs:     Lack of Transportation (Medical):  Lack of Transportation (Non-Medical):    Physical Activity:     Days of Exercise per Week:     Minutes of Exercise per Session:    Stress:     Feeling of Stress :    Social Connections:     Frequency of Communication with Friends and Family:     Frequency of Social Gatherings with Friends and Family:     Attends Jew Services:     Active Member of Clubs or Organizations:     Attends Club or Organization Meetings:     Marital Status:    Intimate Partner Violence:     Fear of Current or Ex-Partner:     Emotionally Abused:     Physically Abused:     Sexually Abused:      Family History   Problem Relation Age of Onset    Diabetes Mother     Crohn's disease Father      No Known Allergies  Current Outpatient Medications on File Prior to Visit   Medication Sig Dispense Refill    ARIPiprazole (ABILIFY) 10 mg tablet Take 10 mg by mouth daily      cariprazine (Vraylar) 1 5 MG capsule Take 1 5 mg by mouth daily      FLUoxetine (PROzac) 20 mg capsule Take 20 mg by mouth daily  (Patient not taking: Reported on 7/28/2021)      OLANZapine (ZyPREXA) 10 mg tablet Take 5 mg by mouth daily at bedtime  (Patient not taking: Reported on 7/28/2021)       No current facility-administered medications on file prior to visit  Review of Systems   Refer to positive review of systems in HPI     Review of Systems    Constitutional- No fever  Eyes- No visual change  ENT- Hearing normal  CV- No chest pain  Resp- No Shortness of breath  GI- No diarrhea  - Bladder normal  MS- No Arthritis   Skin- No rash  Psych- No depression  Endo- No DM  Heme- No nodes    Vitals:    07/28/21 1541   BP: 116/78   BP Location: Left arm   Patient Position: Sitting   Cuff Size: Adult   Pulse: 94   Weight: 105 kg (232 lb)   Height: 6' 4" (1 93 m)       PHYSICAL EXAM:  Appearance: No Acute Distress  Ophthalmoscopic: Disc Flat, Normal fundus  Mental status:  Orientation: Awake, Alert, and Orientedx3  Memory: Registation 3/3 Recall 3/3  Attention: normal  Knowledge: good  Language: No aphasia  Speech: No dysarthria  Cranial Nerves:  2 No Visual Defect on Confrontation, Pupils round, equal, reactive to light  3,4,6 Extraocular Movements Intact, no nystagmus  5 Facial Sensation Intact  7 No facial asymmetry  8 Intact hearing  9,10 Palate symmetric, normal gag  11 Good shoulder shrug  12 Tongue Midline  Gait: independent, unsteady with tandem gait   Coordination: No ataxia with finger to nose testing, and heel to shin  Sensory: Intact, Symmetric to pinprick, light touch, vibration, and joint position  Muscle Tone: Normal              Muscle exam:  Arm Right Left Leg Right Left   Deltoid 5/5 5/5 Iliopsoas 5/5 5/5   Biceps 5/5 5/5 Quads 5/5 5/5   Triceps 5/5 5/5 Hamstrings 5/5 5/5   Wrist Extension 5/5 5/5 Ankle Dorsi Flexion 5/5 5/5   Wrist Flexion 5/5 5/5 Ankle Plantar Flexion 5/5 5/5   Interossei 5/5 5/5 Ankle Eversion 5/5 5/5   APB 5/5 5/5 Ankle Inversion 5/5 5/5       Reflexes   RJ BJ TJ KJ AJ Plantars Subramanian's   Right 2+ 2+ 2+ 2+ 2+ Downgoing Not present   Left 2+ 2+ 2+ 2+ 2+ Downgoing Not present     Personal review of  Mri brain: reviewed findings with patient    Labs:                    Diagnoses and all orders for this visit:        1  Multiple sclerosis (HCC)  Dimethyl Fumarate 120 MG CPDR    Dimethyl Fumarate 240 MG CPDR    Vitamin B12    Vitamin D 25 hydroxy    Vitamin B12    Vitamin D 25 hydroxy   2  Psychiatric disorder     3  Drug-induced dyskinesia     4  Smoking  nicotine polacrilex (COMMIT) 4 MG lozenge         Discussed at length patient has active MS disease and needs to be on DMD before it's too late   We were considering solumedrol however patient has no symptoms from this so, will defer it  Starting him on Tecfidera  Will check vit b12, vit D  His vit D level recommended is >50  Asked him to stop smoking  Will help him through this process  Cont activity as much as tolerated  Encouraged physical exercise               Total time of encounter:  45 min  More than 50% of the time was used in counseling and/or coordination of care  Extent of counseling and/or coordination of care        MD Lory Haque [facility]  [address]  [id number] [place] [address]  [phone]

## 2021-07-30 ENCOUNTER — TELEPHONE (OUTPATIENT)
Dept: NEUROLOGY | Facility: CLINIC | Age: 46
End: 2021-07-30

## 2021-07-30 LAB
25(OH)D3+25(OH)D2 SERPL-MCNC: 21.4 NG/ML (ref 30–100)
VIT B12 SERPL-MCNC: 491 PG/ML (ref 232–1245)

## 2021-07-30 NOTE — TELEPHONE ENCOUNTER
----- Message from Gonzalez Burton MD sent at 7/30/2021  2:09 PM EDT -----  His vit D is low  He can take otc D3 5000 units

## 2021-07-30 NOTE — TELEPHONE ENCOUNTER
I called and spoke with the patient  He will start taking D3 5000 units otc daily  Patient verbalized understanding with no further questions

## 2021-08-10 ENCOUNTER — TELEPHONE (OUTPATIENT)
Dept: NEUROLOGY | Facility: CLINIC | Age: 46
End: 2021-08-10

## 2021-08-10 DIAGNOSIS — G35 MULTIPLE SCLEROSIS (HCC): ICD-10-CM

## 2021-08-10 RX ORDER — DIMETHYL FUMARATE 120 MG/1
120 CAPSULE ORAL 2 TIMES DAILY
Qty: 28 CAPSULE | Refills: 0 | Status: SHIPPED | OUTPATIENT
Start: 2021-08-10 | End: 2021-09-17

## 2021-08-10 NOTE — TELEPHONE ENCOUNTER
I called and spoke with the patient  Funmilayo did reach out to him to start the process for Tecfidera  He was told that the specialty pharmacy will be [facility], phone #[phone], fax #[phone]  He will call the office to let us know his start date for the [address] [place] [address] Drive

## 2021-08-11 DIAGNOSIS — F17.200 SMOKING: ICD-10-CM

## 2021-08-12 RX ORDER — POLYETHYLENE GLYCOL 3350 17 G
4 POWDER IN PACKET (EA) ORAL AS NEEDED
Qty: 100 EACH | Refills: 0 | Status: SHIPPED | OUTPATIENT
Start: 2021-08-12 | End: 2021-08-20

## 2021-08-19 DIAGNOSIS — F17.200 SMOKING: ICD-10-CM

## 2021-08-20 ENCOUNTER — TELEPHONE (OUTPATIENT)
Dept: NEUROLOGY | Facility: CLINIC | Age: 46
End: 2021-08-20

## 2021-08-20 RX ORDER — POLYETHYLENE GLYCOL 3350 17 G
4 POWDER IN PACKET (EA) ORAL AS NEEDED
Qty: 100 EACH | Refills: 0 | Status: SHIPPED | OUTPATIENT
Start: 2021-08-20 | End: 2021-09-13

## 2021-08-20 NOTE — TELEPHONE ENCOUNTER
Maria C from [employer] called and would like to know if you would rather have the patient on a brand name, Ahsan Garces (less GI profile), or because Tecfidera has gone generic, if this would be fine       Maria C's phone #[phone]

## 2021-08-23 NOTE — TELEPHONE ENCOUNTER
I called and spoke with the patient, and he is already taking the generic Tecfidera and had started it 1-2 weeks ago  He states that he is tolerating it well  I also called Maria C from [id number] and made her aware

## 2021-09-13 DIAGNOSIS — F17.200 SMOKING: ICD-10-CM

## 2021-09-13 RX ORDER — POLYETHYLENE GLYCOL 3350 17 G
4 POWDER IN PACKET (EA) ORAL AS NEEDED
Qty: 100 EACH | Refills: 0 | Status: SHIPPED | OUTPATIENT
Start: 2021-09-13 | End: 2021-09-23

## 2021-09-17 DIAGNOSIS — G35 MULTIPLE SCLEROSIS (HCC): ICD-10-CM

## 2021-09-17 RX ORDER — DIMETHYL FUMARATE 120 MG/1
120 CAPSULE ORAL 2 TIMES DAILY
Qty: 28 CAPSULE | Refills: 0 | Status: SHIPPED | OUTPATIENT
Start: 2021-09-17 | End: 2021-11-02

## 2021-09-17 NOTE — TELEPHONE ENCOUNTER
Spoke w/ pharmacy staff and d/c 120mg BID RX   Per Dr Reina Hooker, patient should be on 240mg BID at this point

## 2021-09-22 DIAGNOSIS — F17.200 SMOKING: ICD-10-CM

## 2021-09-23 RX ORDER — POLYETHYLENE GLYCOL 3350 17 G
4 POWDER IN PACKET (EA) ORAL AS NEEDED
Qty: 100 EACH | Refills: 0 | Status: SHIPPED | OUTPATIENT
Start: 2021-09-23 | End: 2021-09-29 | Stop reason: DRUGHIGH

## 2021-09-28 DIAGNOSIS — F17.200 SMOKING: ICD-10-CM

## 2021-09-29 RX ORDER — NAPROXEN SODIUM 220 MG/1
TABLET, FILM COATED ORAL
Qty: 90 EACH | Refills: 1 | Status: SHIPPED | OUTPATIENT
Start: 2021-09-29 | End: 2021-10-26

## 2021-10-11 DIAGNOSIS — G35 MULTIPLE SCLEROSIS (HCC): ICD-10-CM

## 2021-10-11 RX ORDER — DIMETHYL FUMARATE 240 MG/1
240 CAPSULE ORAL 2 TIMES DAILY
Qty: 60 CAPSULE | Refills: 3 | Status: SHIPPED | OUTPATIENT
Start: 2021-10-11 | End: 2021-12-01

## 2021-10-26 DIAGNOSIS — F17.200 SMOKING: ICD-10-CM

## 2021-10-26 RX ORDER — NAPROXEN SODIUM 220 MG/1
TABLET, FILM COATED ORAL
Qty: 100 EACH | Refills: 1 | Status: SHIPPED | OUTPATIENT
Start: 2021-10-26 | End: 2021-12-06 | Stop reason: SDUPTHER

## 2021-11-02 DIAGNOSIS — G35 MULTIPLE SCLEROSIS (HCC): ICD-10-CM

## 2021-11-02 RX ORDER — DIMETHYL FUMARATE 120 MG/1
120 CAPSULE ORAL 2 TIMES DAILY
Qty: 28 CAPSULE | Refills: 0 | Status: SHIPPED | OUTPATIENT
Start: 2021-11-02 | End: 2021-12-01

## 2021-12-01 ENCOUNTER — TELEPHONE (OUTPATIENT)
Dept: NEUROLOGY | Facility: CLINIC | Age: 46
End: 2021-12-01

## 2021-12-01 ENCOUNTER — OFFICE VISIT (OUTPATIENT)
Dept: NEUROLOGY | Facility: CLINIC | Age: 46
End: 2021-12-01
Payer: COMMERCIAL

## 2021-12-01 VITALS
TEMPERATURE: 96.9 F | WEIGHT: 239 LBS | HEIGHT: 76 IN | SYSTOLIC BLOOD PRESSURE: 132 MMHG | HEART RATE: 103 BPM | BODY MASS INDEX: 29.1 KG/M2 | DIASTOLIC BLOOD PRESSURE: 89 MMHG

## 2021-12-01 DIAGNOSIS — G35 MULTIPLE SCLEROSIS (HCC): Primary | ICD-10-CM

## 2021-12-01 DIAGNOSIS — T50.905A ADVERSE EFFECTS OF MEDICATION: ICD-10-CM

## 2021-12-01 PROCEDURE — 3008F BODY MASS INDEX DOCD: CPT | Performed by: PSYCHIATRY & NEUROLOGY

## 2021-12-01 PROCEDURE — 99214 OFFICE O/P EST MOD 30 MIN: CPT | Performed by: PSYCHIATRY & NEUROLOGY

## 2021-12-01 RX ORDER — TERIFLUNOMIDE 7 MG/1
7 TABLET, FILM COATED ORAL DAILY
Qty: 14 TABLET | Refills: 0 | Status: SHIPPED | OUTPATIENT
Start: 2021-12-01 | End: 2022-02-07 | Stop reason: ALTCHOICE

## 2021-12-01 RX ORDER — CARIPRAZINE 3 MG/1
3 CAPSULE, GELATIN COATED ORAL DAILY
COMMUNITY
Start: 2021-11-15

## 2021-12-01 RX ORDER — RENAGEL 800 MG/1
14 TABLET ORAL DAILY
Qty: 30 TABLET | Refills: 4 | Status: SHIPPED | OUTPATIENT
Start: 2021-12-01 | End: 2022-02-07 | Stop reason: ALTCHOICE

## 2021-12-06 DIAGNOSIS — F17.200 SMOKING: ICD-10-CM

## 2021-12-21 ENCOUNTER — TELEPHONE (OUTPATIENT)
Dept: NEUROLOGY | Facility: CLINIC | Age: 46
End: 2021-12-21

## 2021-12-23 ENCOUNTER — TELEPHONE (OUTPATIENT)
Dept: NEUROLOGY | Facility: CLINIC | Age: 46
End: 2021-12-23

## 2022-01-03 DIAGNOSIS — F17.200 SMOKING: ICD-10-CM

## 2022-01-03 RX ORDER — POLYETHYLENE GLYCOL 3350 17 G
4 POWDER IN PACKET (EA) ORAL AS NEEDED
Qty: 100 EACH | Refills: 0 | Status: SHIPPED | OUTPATIENT
Start: 2022-01-03 | End: 2022-02-09

## 2022-01-04 DIAGNOSIS — F17.200 SMOKING: ICD-10-CM

## 2022-01-04 RX ORDER — NAPROXEN SODIUM 220 MG/1
TABLET, FILM COATED ORAL
Qty: 100 EACH | Refills: 1 | Status: SHIPPED | OUTPATIENT
Start: 2022-01-04 | End: 2022-03-09

## 2022-01-14 ENCOUNTER — TELEPHONE (OUTPATIENT)
Dept: NEUROLOGY | Facility: CLINIC | Age: 47
End: 2022-01-14

## 2022-01-14 NOTE — TELEPHONE ENCOUNTER
Received fax from [employer] stating they are unable to reach pt with phone number they have on file  Requesting additional contact information of pt so they can schedule delivery for prescription they have received  Alternate phone number faxed to [employer]  Fax placed in bin to be scanned to chart

## 2022-01-17 ENCOUNTER — TELEPHONE (OUTPATIENT)
Dept: NEUROLOGY | Facility: CLINIC | Age: 47
End: 2022-01-17

## 2022-01-17 NOTE — TELEPHONE ENCOUNTER
[facility] calling to report that PA for Copaxone 40 mg is required  BIN: [id number]  PCN: [facility]  ID: [id number]  Group: Sam    PA submitted  Awaiting determination      Key: [id number]

## 2022-01-19 NOTE — TELEPHONE ENCOUNTER
Ken Colon from [employer] calling to check status of form faxed to our office  States forms faxed is [facility]     Provided fax number [phone]  Fax received to be scanned to this encounter

## 2022-01-21 NOTE — TELEPHONE ENCOUNTER
Received vm from [facility] stating that they have script for copaxone but PA was denied on 1/19    Asking if we are appealing or giving alternative med/      Can you complete glatiramer start form and have dr Edward Watson sign and then fax-  Form can be located at   [email or web address]

## 2022-01-24 NOTE — TELEPHONE ENCOUNTER
Form has been completed, place call to the patient  The patient signature is needed on form prior to faxing the form

## 2022-01-25 NOTE — TELEPHONE ENCOUNTER
Patient came in to office to sign Glatiramer Acetate forms   Form faxed to [phone]    Fax confirmation received

## 2022-01-26 LAB
ALBUMIN SERPL-MCNC: 5 G/DL (ref 4–5)
ALBUMIN/GLOB SERPL: 1.9 {RATIO} (ref 1.2–2.2)
ALP SERPL-CCNC: 81 IU/L (ref 44–121)
ALT SERPL-CCNC: 44 IU/L (ref 0–44)
AST SERPL-CCNC: 27 IU/L (ref 0–40)
BASOPHILS # BLD AUTO: 0.1 X10E3/UL (ref 0–0.2)
BASOPHILS NFR BLD AUTO: 1 %
BILIRUB SERPL-MCNC: 0.5 MG/DL (ref 0–1.2)
BUN SERPL-MCNC: 23 MG/DL (ref 6–24)
BUN/CREAT SERPL: 21 (ref 9–20)
CALCIUM SERPL-MCNC: 10.1 MG/DL (ref 8.7–10.2)
CHLORIDE SERPL-SCNC: 102 MMOL/L (ref 96–106)
CO2 SERPL-SCNC: 21 MMOL/L (ref 20–29)
CREAT SERPL-MCNC: 1.07 MG/DL (ref 0.76–1.27)
EOSINOPHIL # BLD AUTO: 0.1 X10E3/UL (ref 0–0.4)
EOSINOPHIL NFR BLD AUTO: 1 %
ERYTHROCYTE [DISTWIDTH] IN BLOOD BY AUTOMATED COUNT: 12.5 % (ref 11.6–15.4)
GLOBULIN SER-MCNC: 2.7 G/DL (ref 1.5–4.5)
GLUCOSE SERPL-MCNC: 117 MG/DL (ref 65–99)
HCT VFR BLD AUTO: 52.8 % (ref 37.5–51)
HGB BLD-MCNC: 18 G/DL (ref 13–17.7)
IMM GRANULOCYTES # BLD: 0 X10E3/UL (ref 0–0.1)
IMM GRANULOCYTES NFR BLD: 0 %
LYMPHOCYTES # BLD AUTO: 1.8 X10E3/UL (ref 0.7–3.1)
LYMPHOCYTES NFR BLD AUTO: 26 %
MCH RBC QN AUTO: 30 PG (ref 26.6–33)
MCHC RBC AUTO-ENTMCNC: 34.1 G/DL (ref 31.5–35.7)
MCV RBC AUTO: 88 FL (ref 79–97)
MONOCYTES # BLD AUTO: 0.6 X10E3/UL (ref 0.1–0.9)
MONOCYTES NFR BLD AUTO: 8 %
NEUTROPHILS # BLD AUTO: 4.4 X10E3/UL (ref 1.4–7)
NEUTROPHILS NFR BLD AUTO: 64 %
PLATELET # BLD AUTO: 381 X10E3/UL (ref 150–450)
POTASSIUM SERPL-SCNC: 5.5 MMOL/L (ref 3.5–5.2)
PROT SERPL-MCNC: 7.7 G/DL (ref 6–8.5)
RBC # BLD AUTO: 6 X10E6/UL (ref 4.14–5.8)
SL AMB EGFR AFRICAN AMERICAN: 95 ML/MIN/1.73
SL AMB EGFR NON AFRICAN AMERICAN: 82 ML/MIN/1.73
SODIUM SERPL-SCNC: 139 MMOL/L (ref 134–144)
WBC # BLD AUTO: 7 X10E3/UL (ref 3.4–10.8)

## 2022-01-27 DIAGNOSIS — G35 MULTIPLE SCLEROSIS (HCC): Primary | ICD-10-CM

## 2022-01-27 DIAGNOSIS — G35 MULTIPLE SCLEROSIS (HCC): ICD-10-CM

## 2022-01-27 RX ORDER — DIMETHYL FUMARATE 120 MG/1
120 CAPSULE ORAL 2 TIMES DAILY
Qty: 28 CAPSULE | Refills: 0 | Status: SHIPPED | OUTPATIENT
Start: 2022-01-27 | End: 2022-02-07

## 2022-01-27 NOTE — TELEPHONE ENCOUNTER
Received a call today from Jasson Dash at [employer] concerning patient's medication of Galtiraler Acetate  The company received a prescription for this on a Start Form and would like a call back at [phone] to discuss further      Thank  You

## 2022-01-31 RX ORDER — GLATIRAMER 40 MG/ML
40 INJECTION, SOLUTION SUBCUTANEOUS 3 TIMES WEEKLY
Qty: 12 ML | Refills: 11 | Status: SHIPPED | OUTPATIENT
Start: 2022-01-31 | End: 2022-07-12

## 2022-01-31 NOTE — TELEPHONE ENCOUNTER
Pt calling to inform that JAYME Barkerkley requires information  Called [facility] [phone]  Spoke with representative  Confirmed [facility] is the specialty pharmacy  Rep states she will get everything processed then reach out to pt  Will also do a benefit investigation to see if medication will require PA  If so, fax will be sent to our office  Dr Monty Garza - Rx entered  Please review and sign if in agreement

## 2022-02-07 DIAGNOSIS — G35 MULTIPLE SCLEROSIS (HCC): ICD-10-CM

## 2022-02-07 RX ORDER — DIMETHYL FUMARATE 120 MG/1
120 CAPSULE ORAL 2 TIMES DAILY
Qty: 28 CAPSULE | Refills: 0 | Status: SHIPPED | OUTPATIENT
Start: 2022-02-07 | End: 2022-03-17

## 2022-02-07 NOTE — TELEPHONE ENCOUNTER
Called patient LVM to patient that I had a call from a third party requesting a Caye Sniff  I called patient [facility] for further information about the Caye Sniff, and learned that patient did not request for that  I am awaiting patient to call back for more information

## 2022-02-08 DIAGNOSIS — F17.200 SMOKING: ICD-10-CM

## 2022-02-09 ENCOUNTER — TELEPHONE (OUTPATIENT)
Dept: NEUROLOGY | Facility: CLINIC | Age: 47
End: 2022-02-09

## 2022-02-09 RX ORDER — POLYETHYLENE GLYCOL 3350 17 G
4 POWDER IN PACKET (EA) ORAL AS NEEDED
Qty: 100 EACH | Refills: 0 | Status: SHIPPED | OUTPATIENT
Start: 2022-02-09 | End: 2022-02-25

## 2022-02-09 NOTE — TELEPHONE ENCOUNTER
Received fax from [facility]  verify rejection rx glatiramer-duplicate claim, only 1 permitted and send new rx whisperject    Called [facility] at [phone]    Spoke to pharmacist and gave verbal order for whisperject with 3 refills

## 2022-02-22 DIAGNOSIS — G35 MULTIPLE SCLEROSIS (HCC): ICD-10-CM

## 2022-02-22 RX ORDER — DIMETHYL FUMARATE 120 MG/1
120 CAPSULE ORAL 2 TIMES DAILY
Qty: 28 CAPSULE | Refills: 0 | OUTPATIENT
Start: 2022-02-22

## 2022-02-25 DIAGNOSIS — F17.200 SMOKING: ICD-10-CM

## 2022-02-25 RX ORDER — POLYETHYLENE GLYCOL 3350 17 G
4 POWDER IN PACKET (EA) ORAL AS NEEDED
Qty: 100 EACH | Refills: 0 | Status: SHIPPED | OUTPATIENT
Start: 2022-02-25 | End: 2022-03-08

## 2022-03-08 DIAGNOSIS — F17.200 SMOKING: ICD-10-CM

## 2022-03-08 RX ORDER — POLYETHYLENE GLYCOL 3350 17 G
4 POWDER IN PACKET (EA) ORAL AS NEEDED
Qty: 100 EACH | Refills: 0 | Status: SHIPPED | OUTPATIENT
Start: 2022-03-08 | End: 2022-06-30

## 2022-03-09 DIAGNOSIS — F17.200 SMOKING: ICD-10-CM

## 2022-03-09 RX ORDER — NAPROXEN SODIUM 220 MG/1
TABLET, FILM COATED ORAL
Qty: 100 EACH | Refills: 1 | Status: SHIPPED | OUTPATIENT
Start: 2022-03-09

## 2022-03-17 DIAGNOSIS — G35 MULTIPLE SCLEROSIS (HCC): ICD-10-CM

## 2022-03-17 RX ORDER — DIMETHYL FUMARATE 120 MG/1
120 CAPSULE ORAL 2 TIMES DAILY
Qty: 28 CAPSULE | Refills: 0 | Status: SHIPPED | OUTPATIENT
Start: 2022-03-17 | End: 2022-04-04

## 2022-04-04 DIAGNOSIS — G35 MULTIPLE SCLEROSIS (HCC): ICD-10-CM

## 2022-04-04 RX ORDER — DIMETHYL FUMARATE 120 MG/1
120 CAPSULE ORAL 2 TIMES DAILY
Qty: 28 CAPSULE | Refills: 0 | Status: SHIPPED | OUTPATIENT
Start: 2022-04-04 | End: 2022-07-06

## 2022-04-06 ENCOUNTER — TELEPHONE (OUTPATIENT)
Dept: NEUROLOGY | Facility: CLINIC | Age: 47
End: 2022-04-06

## 2022-04-11 ENCOUNTER — OFFICE VISIT (OUTPATIENT)
Dept: NEUROLOGY | Facility: CLINIC | Age: 47
End: 2022-04-11
Payer: MEDICARE

## 2022-04-11 VITALS
HEIGHT: 74 IN | HEART RATE: 96 BPM | DIASTOLIC BLOOD PRESSURE: 100 MMHG | TEMPERATURE: 96.9 F | OXYGEN SATURATION: 96 % | WEIGHT: 242.4 LBS | BODY MASS INDEX: 31.11 KG/M2 | SYSTOLIC BLOOD PRESSURE: 132 MMHG

## 2022-04-11 DIAGNOSIS — G35 MULTIPLE SCLEROSIS (HCC): Primary | ICD-10-CM

## 2022-04-11 PROCEDURE — 3008F BODY MASS INDEX DOCD: CPT | Performed by: PSYCHIATRY & NEUROLOGY

## 2022-04-11 PROCEDURE — 99215 OFFICE O/P EST HI 40 MIN: CPT | Performed by: PSYCHIATRY & NEUROLOGY

## 2022-04-11 RX ORDER — COVID-19 ANTIGEN TEST
KIT MISCELLANEOUS
COMMUNITY
Start: 2022-03-09

## 2022-04-11 RX ORDER — IBUPROFEN 800 MG/1
TABLET ORAL
COMMUNITY
Start: 2022-02-01

## 2022-04-11 NOTE — PROGRESS NOTES
Return NeuroOutpatient Note        Keke Velasquez  [id number]  58 y o   1975       Multiple Sclerosis        History obtained from:  Patient     HPI/Subjective:    Keke Velasquez is a 48 yo M with PMH of MS presents as f/u  Per my previous history, he has known about MS since at least  2010  Patient was dx with MS in [place] about 15 years ago  It had started out as right facial numbness  Per my previous history, patient  was on Rebiff for sometime but it was making him sick so stopped it  Patient was not seeing neurologist regularly  Patient doesn't have much physical limitations  He can't climb ladder  He walks independently and drives  He will occasionally get numbness in feet but his sxs are tolerable  His recent mri brain from 6/28/21  revealed new white matter lesion in the right frontal centrum semiovale with associated enhancement, indicative of an actively demyelinating focus in this patient with history of multiple sclerosis  When we finally got a hold of him, he agreed to start oral DMD  We had started him on tecfidera  He was getting bumpy spotches on his body  He would feel like his hair was growing type of sensation which would last 2-3 hrs after dose  Then we had started him on copaxone generic but from this he gets red blotches, extreme fatigue, flu like symptoms  At injection site, he would get 'knots' and they would last a long time  We had ordered for aubagio prior to this but his insurance hadn't covered  He had tried Rebiff 10 years ago  He had achy, flu like symptoms  His Vit D level was low at 21  He's prescribed but hasn't been taking them  Lately he has been idle at home and has been gaining weight  He is dx with schizoaffective, bipolar disorder  he says that his psychiatrist doesn't discuss his condition with him         Past Medical History:   Diagnosis Date    Multiple sclerosis ([facility] [address] )     Psychiatric disorder      Social History     Socioeconomic History    Marital status: Single     Spouse name: Not on file    Number of children: Not on file    Years of education: Not on file    Highest education level: Not on file   Occupational History    Not on file   Tobacco Use    Smoking status: Current Every Day Smoker     Packs/day: 0 50     Types: Cigarettes    Smokeless tobacco: Current User   Vaping Use    Vaping Use: Former   Substance and Sexual Activity    Alcohol use: No    Drug use: Not Currently     Comment: sts he quit drugs    Sexual activity: Not on file   Other Topics Concern    Not on file   Social History Narrative    ** Merged History Encounter **          Social Determinants of Health     Financial Resource Strain: Not on file   Food Insecurity: Not on file   Transportation Needs: Not on file   Physical Activity: Not on file   Stress: Not on file   Social Connections: Not on file   Intimate Partner Violence: Not on file   Housing Stability: Not on file     Family History   Problem Relation Age of Onset    Diabetes Mother     Crohn's disease Father      No Known Allergies  Current Outpatient Medications on File Prior to Visit   Medication Sig Dispense Refill    Glatiramer Acetate 40 MG/ML SOSY Inject 1 mL (40 mg total) under the skin 3 (three) times a week 12 mL 11    HM Nicotine Polacrilex 4 MG gum CHEW 1 EACH (4 MG TOTAL) AS NEEDED FOR SMOKING CESSATION 100 each 1    ibuprofen (MOTRIN) 800 mg tablet TAKE ONE TABLET BY MOUTH EVERY 8 HOURS WITH TYLENOL      nicotine polacrilex (COMMIT) 4 MG lozenge APPLY 1 LOZENGE (4 MG TOTAL) TO THE MOUTH OR THROAT AS NEEDED FOR SMOKING CESSATION 100 each 0    Vraylar 3 MG capsule Take 3 mg by mouth daily      ARIPiprazole (ABILIFY) 10 mg tablet Take 10 mg by mouth daily (Patient not taking: Reported on 4/11/2022 )      cariprazine (Vraylar) 1 5 MG capsule Take 1 5 mg by mouth daily (Patient not taking: Reported on 4/11/2022 )      Dimethyl Fumarate 120 MG CPDR TAKE 1 CAPSULE (120 MG TOTAL) BY MOUTH 2 (TWO) TIMES A DAY (Patient not taking: Reported on 4/11/2022 ) 28 capsule 0    QuickVue At-Home Covid-19 Test KIT Use as directed (Patient not taking: Reported on 4/11/2022 )       No current facility-administered medications on file prior to visit  Review of Systems   Refer to positive review of systems in HPI     Review of Systems    Constitutional- No fever  Eyes- No visual change  ENT- Hearing normal  CV- No chest pain  Resp- No Shortness of breath  GI- No diarrhea  - Bladder normal  MS- No Arthritis   Skin- No rash  Psych- No depression  Endo- No DM  Heme- No nodes    Vitals:    04/11/22 1012   BP: 132/100   BP Location: Left arm   Patient Position: Sitting   Cuff Size: Adult   Pulse: 96   Temp: (!) 96 9 °F (36 1 °C)   TempSrc: Temporal   SpO2: 96%   Weight: 110 kg (242 lb 6 4 oz)   Height: 6' 2" (1 88 m)       PHYSICAL EXAM:  Appearance: No Acute Distress  Ophthalmoscopic: Disc Flat, Normal fundus  Mental status:  Orientation: Awake, Alert, and Orientedx3  Memory: Registation 3/3 Recall 3/3  Attention: normal  Knowledge: good  Language: No aphasia  Speech: No dysarthria  Cranial Nerves:  2 No Visual Defect on Confrontation, Pupils round, equal, reactive to light  3,4,6 Extraocular Movements Intact, no nystagmus  5 Facial Sensation Intact  7 No facial asymmetry  8 Intact hearing  9,10 Palate symmetric, normal gag  11 Good shoulder shrug  12 Tongue Midline  Gait: Stable  Coordination: No ataxia with finger to nose testing, and heel to shin  Sensory: Intact, Symmetric to pinprick, light touch, vibration, and joint position  Muscle Tone: Normal              Muscle exam:  Arm Right Left Leg Right Left   Deltoid 5/5 5/5 Iliopsoas 5/5 5/5   Biceps 5/5 5/5 Quads 5/5 5/5   Triceps 5/5 5/5 Hamstrings 5/5 5/5   Wrist Extension 5/5 5/5 Ankle Dorsi Flexion 5/5 5/5   Wrist Flexion 5/5 5/5 Ankle Plantar Flexion 5/5 5/5   Interossei 5/5 5/5 Ankle Eversion 5/5 5/5   APB 5/5 5/5 Ankle Inversion 5/5 5/5 Reflexes   RJ BJ TJ KJ AJ Plantars Subramanian's   Right 2+ 2+ 2+ 2+  Downgoing Not present   Left 2+ 2+ 2+ 2+  Downgoing Not present     Personal review of  Labs:                  Diagnoses and all orders for this visit:      1  Multiple sclerosis ([address] )           Patient has remained stable but he's having hard time tolerating various DMDs  He's tried multiple injectables and one of oral tecfidera  We will try to get aubagio approved  Strongly encouraged activity and going for long walks or jog  Discussed he needs to stop smoking  Asked him to see an optometrist or ophthalmology for occasional blurred vision               Total time of encounter:  40 min  More than 50% of the time was used in counseling and/or coordination of care  Extent of counseling and/or coordination of care        MD [facility]  [address] [place] [address]  [phone]

## 2022-04-22 ENCOUNTER — TELEPHONE (OUTPATIENT)
Dept: NEUROLOGY | Facility: CLINIC | Age: 47
End: 2022-04-22

## 2022-04-25 ENCOUNTER — TELEPHONE (OUTPATIENT)
Dept: NEUROLOGY | Facility: CLINIC | Age: 47
End: 2022-04-25

## 2022-04-25 NOTE — TELEPHONE ENCOUNTER
Received form from pharmacy to obtain patient RX information called patient x2 to obtain this information so we can fax back needed form/ awaiting patient call form scanned in

## 2022-05-06 ENCOUNTER — TELEPHONE (OUTPATIENT)
Dept: NEUROLOGY | Facility: CLINIC | Age: 47
End: 2022-05-06

## 2022-06-01 ENCOUNTER — TELEPHONE (OUTPATIENT)
Dept: NEUROLOGY | Facility: CLINIC | Age: 47
End: 2022-06-01

## 2022-06-01 DIAGNOSIS — G35 MULTIPLE SCLEROSIS (HCC): Primary | ICD-10-CM

## 2022-06-01 RX ORDER — RENAGEL 800 MG/1
14 TABLET ORAL DAILY
Qty: 30 TABLET | Refills: 3 | Status: SHIPPED | OUTPATIENT
Start: 2022-06-01 | End: 2022-07-12 | Stop reason: SDUPTHER

## 2022-06-01 RX ORDER — TERIFLUNOMIDE 7 MG/1
7 TABLET, FILM COATED ORAL DAILY
Qty: 14 TABLET | Refills: 0 | Status: SHIPPED | OUTPATIENT
Start: 2022-06-01

## 2022-06-01 NOTE — TELEPHONE ENCOUNTER
Received fax from [facility], according to their insurance benefit verification, he can use [employer] for Aubagio 30 day supply for a cost of $4   Please send to [facility] to [employer] #[phone])    Thanks

## 2022-06-06 NOTE — TELEPHONE ENCOUNTER
Received a call from Gladis Hsieh at [facility] asking to speak to nurse about clarification for the prescription  Says we should call [facility] at [phone] and they can take care of it there

## 2022-06-28 DIAGNOSIS — F17.200 SMOKING: ICD-10-CM

## 2022-06-28 DIAGNOSIS — G35 MULTIPLE SCLEROSIS (HCC): ICD-10-CM

## 2022-06-29 NOTE — TELEPHONE ENCOUNTER
Spoke to the patient regarding his medication dimethyl fumarate 120 mg tablets  The patient states that the only medication that he is currently taking is the Aubagio and the nicotine lozenges

## 2022-06-30 RX ORDER — DIMETHYL FUMARATE 120 MG/1
120 CAPSULE ORAL 2 TIMES DAILY
Qty: 28 CAPSULE | Refills: 0 | OUTPATIENT
Start: 2022-06-30

## 2022-06-30 RX ORDER — POLYETHYLENE GLYCOL 3350 17 G
4 POWDER IN PACKET (EA) ORAL AS NEEDED
Qty: 100 EACH | Refills: 0 | Status: SHIPPED | OUTPATIENT
Start: 2022-06-30

## 2022-06-30 NOTE — TELEPHONE ENCOUNTER
The patient states that he is not taking the medication currently dimethyl fumarate 120 mg tablets  The patient states that he was taken off of the medication by the physician at the last office visit and started on the [place]

## 2022-07-06 DIAGNOSIS — G35 MULTIPLE SCLEROSIS (HCC): ICD-10-CM

## 2022-07-06 RX ORDER — DIMETHYL FUMARATE 120 MG/1
120 CAPSULE ORAL 2 TIMES DAILY
Qty: 28 CAPSULE | Refills: 0 | Status: SHIPPED | OUTPATIENT
Start: 2022-07-06 | End: 2022-07-12

## 2022-07-08 NOTE — TELEPHONE ENCOUNTER
Rep from [facility] called in to check the status of the PA for the Aubaigo medication, I informed the rep that I have received the forms and they have been forward to the correct team for processing  Form if scanned into Clinical nurse basket team 6

## 2022-07-11 NOTE — TELEPHONE ENCOUNTER
[facility]  [facility]  ID - [id number]  BIN - [id number]  [facility]      Completed PA and sent to plan; Key: [id number]    Awaiting determination

## 2022-07-12 DIAGNOSIS — G35 MULTIPLE SCLEROSIS (HCC): ICD-10-CM

## 2022-07-12 RX ORDER — RENAGEL 800 MG/1
14 TABLET ORAL DAILY
Qty: 30 TABLET | Refills: 3 | Status: SHIPPED | OUTPATIENT
Start: 2022-07-12 | End: 2022-10-27

## 2022-07-12 NOTE — TELEPHONE ENCOUNTER
Received call here at [facility] from patient  When answered call  Patient stated  needs Aubagio 14mg Tabs sent to [facility] pharmacy until his prior auth is approved please send if agreeable to send and refill until PA goes through  do we have an update on the prior auth? Patient has asked for a call back when PA is approved           Approved Prescriptions     Teriflunomide (Aubagio) 14 MG TABS         Sig: Take 1 tablet (14 mg total) by mouth in the morning Starting June 17th      Disp:  30 tablet    Refills:  3    Start: 7/12/2022    Class: Normal    Authorized by: Ale Theodore MD    Non-formulary For: Multiple sclerosis [facility])        To be filled at: [facility]/pharmacy #[id number] - [place], [address] [place] [address]

## 2022-07-15 NOTE — TELEPHONE ENCOUNTER
Called [facility] at [phone], spoke with Kim  Advised I call [phone]  He tried to connect me but line disconnected  Called back, spoke with Kathy Titus  She advised I call [phone] ext [phone]  She tried to transfer, but line disconnected  Called to the extension, spoke with Kirk  He informed me the med is managed through [employer] (medicaid) and advised I call [phone]  Called and spoke with [address] [place] [address]  She provided me patient's ID number [id number]  States we need to respond to clinical questions  I advised her that we have done so already  She advised I can completed this over the phone  She tried to transferred the call, but was disconnected  Called back to [phone], spoke with Felicia Gentile  She informed me that we need an entirely new PA  Key [id number]  Tried to complete PA on CMM, reached the following message: Additional Information Required  A pending Prior Authorization request for the Patient and Medication exist     Will follow up

## 2022-07-18 NOTE — TELEPHONE ENCOUNTER
[facility] specialty pharm called and states that aubagio needs PA  Made her aware of below  Called [facility] at [phone] and spoke to carmelita  States that PA request , not denied or approved and  transferred me to appeals dept -[phone] option 2  Spoke to [place] is not on formulary and requires formulary exception  Formulary exception completed over the phone and approved  Approved 2022-23  Approval letter will be faxed    Called [facility] at [phone] and spoke to roger and  made him aware of approval   Transferred to [address]60 [phone]  States that she would need approval number or case number  I made her aware that I only have approval dates  She was able to process this and They will reach out to pt       Pt made aware of above

## 2022-08-22 ENCOUNTER — HOSPITAL ENCOUNTER (EMERGENCY)
Facility: HOSPITAL | Age: 47
Discharge: HOME | End: 2022-08-22
Attending: EMERGENCY MEDICINE
Payer: MEDICARE

## 2022-08-22 VITALS
SYSTOLIC BLOOD PRESSURE: 149 MMHG | HEIGHT: 74 IN | WEIGHT: 241 LBS | HEART RATE: 118 BPM | BODY MASS INDEX: 30.93 KG/M2 | DIASTOLIC BLOOD PRESSURE: 97 MMHG | OXYGEN SATURATION: 96 % | RESPIRATION RATE: 20 BRPM | TEMPERATURE: 96.8 F

## 2022-08-22 DIAGNOSIS — K12.2 MOUTH ABSCESS: Primary | ICD-10-CM

## 2022-08-22 PROCEDURE — 99283 EMERGENCY DEPT VISIT LOW MDM: CPT

## 2022-08-22 PROCEDURE — 41008 DRAINAGE OF MOUTH LESION: CPT | Performed by: EMERGENCY MEDICINE

## 2022-08-22 PROCEDURE — 96372 THER/PROPH/DIAG INJ SC/IM: CPT

## 2022-08-22 PROCEDURE — 99284 EMERGENCY DEPT VISIT MOD MDM: CPT | Performed by: EMERGENCY MEDICINE

## 2022-08-22 RX ORDER — KETOROLAC TROMETHAMINE 30 MG/ML
15 INJECTION, SOLUTION INTRAMUSCULAR; INTRAVENOUS ONCE
Status: COMPLETED | OUTPATIENT
Start: 2022-08-22 | End: 2022-08-22

## 2022-08-22 RX ORDER — LIDOCAINE HYDROCHLORIDE 20 MG/ML
15 SOLUTION OROPHARYNGEAL 4 TIMES DAILY PRN
Qty: 100 ML | Refills: 0 | Status: SHIPPED | OUTPATIENT
Start: 2022-08-22

## 2022-08-22 RX ORDER — OXYCODONE HYDROCHLORIDE 5 MG/1
5 TABLET ORAL EVERY 4 HOURS PRN
Qty: 5 TABLET | Refills: 0 | Status: SHIPPED | OUTPATIENT
Start: 2022-08-22

## 2022-08-22 RX ORDER — CLINDAMYCIN HYDROCHLORIDE 300 MG/1
300 CAPSULE ORAL 4 TIMES DAILY
Qty: 28 CAPSULE | Refills: 0 | Status: SHIPPED | OUTPATIENT
Start: 2022-08-22 | End: 2022-08-29

## 2022-08-22 RX ORDER — LIDOCAINE HYDROCHLORIDE 20 MG/ML
15 SOLUTION OROPHARYNGEAL ONCE
Status: COMPLETED | OUTPATIENT
Start: 2022-08-22 | End: 2022-08-22

## 2022-08-22 RX ORDER — ACETAMINOPHEN 325 MG/1
975 TABLET ORAL ONCE
Status: COMPLETED | OUTPATIENT
Start: 2022-08-22 | End: 2022-08-22

## 2022-08-22 RX ADMIN — LIDOCAINE HYDROCHLORIDE 15 ML: 20 SOLUTION ORAL; TOPICAL at 07:42

## 2022-08-22 RX ADMIN — KETOROLAC TROMETHAMINE 15 MG: 30 INJECTION, SOLUTION INTRAMUSCULAR at 07:38

## 2022-08-22 RX ADMIN — ACETAMINOPHEN 975 MG: 325 TABLET, FILM COATED ORAL at 07:37

## 2022-08-22 NOTE — ED PROVIDER NOTES
History  Chief Complaint   Patient presents with    Dental Pain     Pt states he has a blister on the roof of his mouth that began Wednesday or Thursday and has been increasing in pain  Pt is unable to eat and pain is level 10  HPI  Patient is a 48-year-old male history of multiple sclerosis on biologic treatment presenting for evaluation of painful area and roof of mouth with swelling which is expanded for the past 4 days  Patient denies any specific trauma to the area  Patient denies any dental pain or dental trauma  Patient denies fevers, chills, fatigue, constitutional symptoms  Patient denies any facial swelling outside of his mouth  Patient denies any prior similar event  Prior to Admission Medications   Prescriptions Last Dose Informant Patient Reported? Taking? ARIPiprazole (ABILIFY) 10 mg tablet   Yes No   Sig: Take 10 mg by mouth daily   Patient not taking: Reported on 4/11/2022    HM Nicotine Polacrilex 4 MG gum   No No   Sig: CHEW 1 EACH (4 MG TOTAL) AS NEEDED FOR SMOKING CESSATION   QuickVue At-Home Covid-19 Test KIT   Yes No   Sig: Use as directed   Patient not taking: Reported on 4/11/2022    Teriflunomide (Aubagio) 14 MG TABS   No No   Sig: Take 1 tablet (14 mg total) by mouth in the morning Starting June 17th  Teriflunomide (Aubagio) 7 MG TABS   No No   Sig: Take 1 tablet (7 mg total) by mouth in the morning For 2 weeks     Vraylar 3 MG capsule   Yes No   Sig: Take 3 mg by mouth daily   cariprazine (Vraylar) 1 5 MG capsule   Yes No   Sig: Take 1 5 mg by mouth daily   Patient not taking: Reported on 4/11/2022    ibuprofen (MOTRIN) 800 mg tablet   Yes No   Sig: TAKE ONE TABLET BY MOUTH EVERY 8 HOURS WITH TYLENOL   nicotine polacrilex (COMMIT) 4 MG lozenge   No No   Sig: APPLY 1 LOZENGE (4 MG TOTAL) TO THE MOUTH OR THROAT AS NEEDED FOR SMOKING CESSATION      Facility-Administered Medications: None       Past Medical History:   Diagnosis Date    Multiple sclerosis [address] [address] )     Psychiatric disorder        Past Surgical History:   Procedure Laterality Date    HAND SURGERY         Family History   Problem Relation Age of Onset    Diabetes Mother     Crohn's disease Father      I have reviewed and agree with the history as documented  E-Cigarette/Vaping    E-Cigarette Use Former User      E-Cigarette/Vaping Substances    Nicotine No     THC No     CBD No     Other No     Unknown No      Social History     Tobacco Use    Smoking status: Current Every Day Smoker     Packs/day: 0 50     Types: Cigarettes    Smokeless tobacco: Current User   Vaping Use    Vaping Use: Former   Substance Use Topics    Alcohol use: No    Drug use: Not Currently     Comment: sts he quit drugs       Review of Systems   Constitutional: Negative for chills, fatigue and fever  HENT: Negative for dental problem, facial swelling, sinus pressure, sinus pain, trouble swallowing and voice change  Musculoskeletal: Negative for arthralgias and myalgias  Skin: Positive for wound  Negative for color change, pallor and rash  Neurological: Negative for headaches  All other systems reviewed and are negative  Physical Exam  Physical Exam  Vitals and nursing note reviewed  Constitutional:       General: He is not in acute distress  Appearance: He is well-developed  He is not diaphoretic  HENT:      Head: Normocephalic and atraumatic  Comments: Roughly 2 5 cm area of swelling and midline anterior midline hard palate with associated fluctuance  Right Ear: External ear normal       Left Ear: External ear normal       Nose: Nose normal       Mouth/Throat:      Pharynx: No oropharyngeal exudate  Eyes:      Conjunctiva/sclera: Conjunctivae normal       Pupils: Pupils are equal, round, and reactive to light  Cardiovascular:      Rate and Rhythm: Normal rate and regular rhythm  Heart sounds: Normal heart sounds  No murmur heard  No friction rub  No gallop     Pulmonary:      Effort: Pulmonary effort is normal  No respiratory distress  Breath sounds: Normal breath sounds  No wheezing  Chest:      Chest wall: No tenderness  Abdominal:      General: Bowel sounds are normal  There is no distension  Palpations: Abdomen is soft  There is no mass  Tenderness: There is no abdominal tenderness  There is no guarding or rebound  Musculoskeletal:         General: No deformity  Skin:     General: Skin is warm and dry  Capillary Refill: Capillary refill takes less than 2 seconds  Neurological:      Mental Status: He is alert and oriented to person, place, and time  Psychiatric:         Behavior: Behavior normal          Vital Signs  ED Triage Vitals [08/22/22 0728]   Temperature Pulse Respirations Blood Pressure SpO2   (!) 96 8 °F (36 °C) (!) 118 20 149/97 96 %      Temp Source Heart Rate Source Patient Position - Orthostatic VS BP Location FiO2 (%)   Tympanic Monitor Sitting Left arm --      Pain Score       5           Vitals:    08/22/22 0728   BP: 149/97   Pulse: (!) 118   Patient Position - Orthostatic VS: Sitting         Visual Acuity      ED Medications  Medications   ketorolac (TORADOL) injection 15 mg (15 mg Intramuscular Given 8/22/22 0738)   Lidocaine Viscous HCl (XYLOCAINE) 2 % mucosal solution 15 mL (15 mL Swish & Spit Given 8/22/22 0742)   acetaminophen (TYLENOL) tablet 975 mg (975 mg Oral Given 8/22/22 0737)       Diagnostic Studies  Results Reviewed     None                 No orders to display              Procedures  Incision and drain    Date/Time: 8/22/2022 7:58 AM  Performed by: Dulce Carranza MD  Authorized by: Dulce Carranza MD   Universal Protocol:  Patient identity confirmed: verbally with patient      Patient location:  ED  Location:     Type:  Abscess    Location:  Mouth    Mouth location:  Palate  Anesthesia (see MAR for exact dosages):      Anesthesia method:  Topical application    Topical anesthetic:  Lidocaine gel  Procedure details: Complexity:  Simple    Incision types:  Stab incision    Scalpel blade:  11    Incision depth:  Subcutaneous    Wound management:  Probed and deloculated and irrigated with saline    Drainage:  Purulent    Drainage amount: Moderate    Wound treatment:  Wound left open    Packing materials:  None  Post-procedure details:     Patient tolerance of procedure: Tolerated well, no immediate complications             ED Course                               SBIRT 22yo+    Flowsheet Row Most Recent Value   SBIRT (25 yo +)    In order to provide better care to our patients, we are screening all of our patients for alcohol and drug use  Would it be okay to ask you these screening questions? No Filed at: 08/22/2022 0740                    MDM  Number of Diagnoses or Management Options  Mouth abscess  Diagnosis management comments: Multi cm mouth abscess of hard palate  Denying any constitutional symptoms  I and D with significant purulent discharge  Patient stating much improved comfort following procedure  Treated with a course of clindamycin, provided with OMFS follow-up, discharged with verbal and written return precautions  Disposition  Final diagnoses:   Mouth abscess     Time reflects when diagnosis was documented in both MDM as applicable and the Disposition within this note     Time User Action Codes Description Comment    8/22/2022  7:58 AM Susan Winter [K12 2] Mouth abscess       ED Disposition     ED Disposition   Discharge    Condition   Stable    Date/Time   Mon Aug 22, 2022  8:08 AM    Comment   Macarena Guardado discharge to home/self care                 Follow-up Information     Follow up With Specialties Details Why [address] for Oral and [address]          Discharge Medication List as of 8/22/2022  8:13 AM      START taking these medications    Details   clindamycin (CLEOCIN) 300 MG capsule Take 1 capsule (300 mg total) by mouth 4 (four) times a day for 7 days, Starting Mon 8/22/2022, Until Mon 8/29/2022, Normal      Lidocaine Viscous HCl (XYLOCAINE) 2 % mucosal solution Swish and spit 15 mL 4 (four) times a day as needed for mouth pain or discomfort, Starting Mon 8/22/2022, Normal      oxyCODONE (Roxicodone) 5 immediate release tablet Take 1 tablet (5 mg total) by mouth every 4 (four) hours as needed for moderate pain for up to 5 doses Max Daily Amount: 30 mg, Starting Mon 8/22/2022, Normal         CONTINUE these medications which have NOT CHANGED    Details   ARIPiprazole (ABILIFY) 10 mg tablet Take 10 mg by mouth daily, Historical Med      !! cariprazine (Vraylar) 1 5 MG capsule Take 1 5 mg by mouth daily, Historical Med      HM Nicotine Polacrilex 4 MG gum CHEW 1 EACH (4 MG TOTAL) AS NEEDED FOR SMOKING CESSATION, Normal      ibuprofen (MOTRIN) 800 mg tablet TAKE ONE TABLET BY MOUTH EVERY 8 HOURS WITH TYLENOL, Historical Med      nicotine polacrilex (COMMIT) 4 MG lozenge APPLY 1 LOZENGE (4 MG TOTAL) TO THE MOUTH OR THROAT AS NEEDED FOR SMOKING CESSATION, Starting Thu 6/30/2022, Normal      QuickVue At-Home Covid-19 Test KIT Use as directed, Starting Wed 3/9/2022, Historical Med      !! Teriflunomide (Aubagio) 14 MG TABS Take 1 tablet (14 mg total) by mouth in the morning Starting June 17th , Starting Tue 7/12/2022, Normal      !! Teriflunomide (Aubagio) 7 MG TABS Take 1 tablet (7 mg total) by mouth in the morning For 2 weeks  , Starting Wed 6/1/2022, Normal      !! Vraylar 3 MG capsule Take 3 mg by mouth daily, Starting Mon 11/15/2021, Historical Med       !! - Potential duplicate medications found  Please discuss with provider  No discharge procedures on file      PDMP Review       Value Time User    PDMP Reviewed  Yes 8/22/2022  8:08 AM Katie Strange MD          ED Provider  Electronically Signed by           Katie Strange MD  08/22/22 03108 [phone]

## 2022-08-22 NOTE — DISCHARGE INSTRUCTIONS
If area of incision starts to swell again, if you have severe pain, fevers, chills, facial weakness or numbness, return to the emergency department immediately  Take the prescribed clindamycin antibiotic for the next 7 days  Use the prescribed oxycodone as needed for breakthrough pain  Take Tylenol and Motrin every 6 hours for baseline pain  Use lidocaine mouthwash to numb your mouth

## 2022-08-23 ENCOUNTER — OFFICE VISIT (OUTPATIENT)
Dept: FAMILY MEDICINE CLINIC | Facility: CLINIC | Age: 47
End: 2022-08-23
Payer: MEDICARE

## 2022-08-23 VITALS
DIASTOLIC BLOOD PRESSURE: 60 MMHG | RESPIRATION RATE: 16 BRPM | SYSTOLIC BLOOD PRESSURE: 112 MMHG | HEART RATE: 128 BPM | TEMPERATURE: 98.6 F | WEIGHT: 240.1 LBS | OXYGEN SATURATION: 96 % | HEIGHT: 74 IN | BODY MASS INDEX: 30.81 KG/M2

## 2022-08-23 DIAGNOSIS — Z71.82 EXERCISE COUNSELING: ICD-10-CM

## 2022-08-23 DIAGNOSIS — T07.XXXA ABRASIONS OF MULTIPLE SITES: ICD-10-CM

## 2022-08-23 DIAGNOSIS — Z71.3 DIETARY COUNSELING: ICD-10-CM

## 2022-08-23 DIAGNOSIS — Z00.00 MEDICARE ANNUAL WELLNESS VISIT, INITIAL: Primary | ICD-10-CM

## 2022-08-23 DIAGNOSIS — M26.20: ICD-10-CM

## 2022-08-23 PROBLEM — F32.1 MODERATE MAJOR DEPRESSION, SINGLE EPISODE (HCC): Status: ACTIVE | Noted: 2022-08-23

## 2022-08-23 PROBLEM — G35 MULTIPLE SCLEROSIS (HCC): Status: ACTIVE | Noted: 2022-08-23

## 2022-08-23 PROCEDURE — G0402 INITIAL PREVENTIVE EXAM: HCPCS | Performed by: FAMILY MEDICINE

## 2022-08-23 NOTE — PATIENT INSTRUCTIONS
Medicare Preventive Visit Patient Instructions  Thank you for completing your Welcome to Medicare Visit or Medicare Annual Wellness Visit today  Your next wellness visit will be due in one year (8/24/2023)  The screening/preventive services that you may require over the next 5-10 years are detailed below  Some tests may not apply to you based off risk factors and/or age  Screening tests ordered at today's visit but not completed yet may show as past due  Also, please note that scanned in results may not display below  Preventive Screenings:  Service Recommendations Previous Testing/Comments   Colorectal Cancer Screening  · Colonoscopy    · Fecal Occult Blood Test (FOBT)/Fecal Immunochemical Test (FIT)  · Fecal DNA/Cologuard Test  · Flexible Sigmoidoscopy Age: 39-70 years old   Colonoscopy: every 10 years (May be performed more frequently if at higher risk)  OR  FOBT/FIT: every 1 year  OR  Cologuard: every 3 years  OR  Sigmoidoscopy: every 5 years  Screening may be recommended earlier than age 39 if at higher risk for colorectal cancer  Also, an individualized decision between you and your healthcare provider will decide whether screening between the ages of 74-80 would be appropriate   Colonoscopy: Not on file  FOBT/FIT: Not on file  Cologuard: Not on file  Sigmoidoscopy: Not on file          Prostate Cancer Screening Individualized decision between patient and health care provider in men between ages of 53-78   Medicare will cover every 12 months beginning on the day after your 50th birthday PSA: No results in last 5 years     Screening Not Indicated     Hepatitis C Screening Once for adults born between [facility]  More frequently in patients at high risk for Hepatitis C Hep C Antibody: Not on file        Diabetes Screening 1-2 times per year if you're at risk for diabetes or have pre-diabetes Fasting glucose: No results in last 5 years (No results in last 5 years)  A1C: No results in last 5 years (No results in last 5 years)  Screening Current   Cholesterol Screening Once every 5 years if you don't have a lipid disorder  May order more often based on risk factors  Lipid panel: Not on file         Other Preventive Screenings Covered by Medicare:  1  Abdominal Aortic Aneurysm (AAA) Screening: covered once if your at risk  You're considered to be at risk if you have a family history of AAA or a male between the age of 73-68 who smoking at least 100 cigarettes in your lifetime  2  Lung Cancer Screening: covers low dose CT scan once per year if you meet all of the following conditions: (1) Age 50-69; (2) No signs or symptoms of lung cancer; (3) Current smoker or have quit smoking within the last 15 years; (4) You have a tobacco smoking history of at least 20 pack years (packs per day x number of years you smoked); (5) You get a written order from a healthcare provider  3  Glaucoma Screening: covered annually if you're considered high risk: (1) You have diabetes OR (2) Family history of glaucoma OR (3)  aged 48 and older OR (3)  American aged 72 and older  3  Osteoporosis Screening: covered every 2 years if you meet one of the following conditions: (1) Have a vertebral abnormality; (2) On glucocorticoid therapy for more than 3 months; (3) Have primary hyperparathyroidism; (4) On osteoporosis medications and need to assess response to drug therapy  5  HIV Screening: covered annually if you're between the age of 12-76  Also covered annually if you are younger than 13 and older than 72 with risk factors for HIV infection  For pregnant patients, it is covered up to 3 times per pregnancy      Immunizations:  Immunization Recommendations   Influenza Vaccine Annual influenza vaccination during flu season is recommended for all persons aged >= 6 months who do not have contraindications   Pneumococcal Vaccine   * Pneumococcal conjugate vaccine = PCV13 (Prevnar 13), PCV15 (Vaxneuvance), PCV20 (Prevnar 20)  * Pneumococcal polysaccharide vaccine = PPSV23 (Pneumovax) Adults 2364 years old: 1-3 doses may be recommended based on certain risk factors  Adults 72 years old: 1-2 doses may be recommended based off what pneumonia vaccine you previously received   Hepatitis B Vaccine 3 dose series if at intermediate or high risk (ex: diabetes, end stage renal disease, liver disease)   Tetanus (Td) Vaccine - COST NOT COVERED BY MEDICARE PART B Following completion of primary series, a booster dose should be given every 10 years to maintain immunity against tetanus  Td may also be given as tetanus wound prophylaxis  Tdap Vaccine - COST NOT COVERED BY MEDICARE PART B Recommended at least once for all adults  For pregnant patients, recommended with each pregnancy  Shingles Vaccine (Shingrix) - COST NOT COVERED BY MEDICARE PART B  2 shot series recommended in those aged 48 and above     Health Maintenance Due:      Topic Date Due    Hepatitis C Screening  Never done    HIV Screening  Never done    Colorectal Cancer Screening  Never done     Immunizations Due:      Topic Date Due    COVID-19 Vaccine (1) Never done    Hepatitis A Vaccine (1 of 2 - Risk 2-dose series) Never done    Pneumococcal Vaccine: Pediatrics (0 to 5 Years) and At-Risk Patients (6 to 59 Years) (1 - PCV) Never done    Hepatitis B Vaccine (1 of 3 - Risk 3-dose series) Never done    Influenza Vaccine (1) 09/01/2022     Advance Directives   What are advance directives? Advance directives are legal documents that state your wishes and plans for medical care  These plans are made ahead of time in case you lose your ability to make decisions for yourself  Advance directives can apply to any medical decision, such as the treatments you want, and if you want to donate organs  What are the types of advance directives? There are many types of advance directives, and each state has rules about how to use them   You may choose a combination of any of the following:  · Living will: This is a written record of the treatment you want  You can also choose which treatments you do not want, which to limit, and which to stop at a certain time  This includes surgery, medicine, IV fluid, and tube feedings  · Durable power of  for healthcare [facility]): This is a written record that states who you want to make healthcare choices for you when you are unable to make them for yourself  This person, called a proxy, is usually a family member or a friend  You may choose more than 1 proxy  · Do not resuscitate (DNR) order:  A DNR order is used in case your heart stops beating or you stop breathing  It is a request not to have certain forms of treatment, such as CPR  A DNR order may be included in other types of advance directives  · Medical directive: This covers the care that you want if you are in a coma, near death, or unable to make decisions for yourself  You can list the treatments you want for each condition  Treatment may include pain medicine, surgery, blood transfusions, dialysis, IV or tube feedings, and a ventilator (breathing machine)  · Values history: This document has questions about your views, beliefs, and how you feel and think about life  This information can help others choose the care that you would choose  Why are advance directives important? An advance directive helps you control your care  Although spoken wishes may be used, it is better to have your wishes written down  Spoken wishes can be misunderstood, or not followed  Treatments may be given even if you do not want them  An advance directive may make it easier for your family to make difficult choices about your care  Depression   Depression  is a medical condition that causes feelings of sadness or hopelessness that do not go away  Depression may cause you to lose interest in things you used to enjoy  These feelings may interfere with your daily life    Call your local emergency number (911 in the [place]) if:   · You think about harming yourself or someone else  · You have done something on purpose to hurt yourself  The following resources are available at any time to help you, if needed:   · [facility]: 9-188.658.4410 (3-917-497-LZIO)   · Suicide Hotline: 5-443.687.7501 (2-756-PXTHWQW)   · For a list of international numbers: https://save org/find-help/international-resources/  Treatment for depression may include medicine to relieve depression  Medicine is often used together with therapy  Therapy is a way for you to talk about your feelings and anything that may be causing depression  Therapy can be done alone or in a group  It may also be done with family members or a significant other  · Get regular physical activity  · Create a regular sleep schedule  · Eat a variety of healthy foods  · Do not drink alcohol or use drugs  Cigarette Smoking and Your Health   Risks to your health if you smoke:  Nicotine and other chemicals found in tobacco damage every cell in your body  Even if you are a light smoker, you have an increased risk for cancer, heart disease, and lung disease  If you are pregnant or have diabetes, smoking increases your risk for complications  Benefits to your health if you stop smoking:   · You decrease respiratory symptoms such as coughing, wheezing, and shortness of breath  · You reduce your risk for cancers of the lung, mouth, throat, kidney, bladder, pancreas, stomach, and cervix  If you already have cancer, you increase the benefits of chemotherapy  You also reduce your risk for cancer returning or a second cancer from developing  · You reduce your risk for heart disease, blood clots, heart attack, and stroke  · You reduce your risk for lung infections, and diseases such as pneumonia, asthma, chronic bronchitis, and emphysema  · Your circulation improves  More oxygen can be delivered to your body   If you have diabetes, you lower your risk for complications, such as kidney, artery, and eye diseases  You also lower your risk for nerve damage  Nerve damage can lead to amputations, poor vision, and blindness  · You improve your body's ability to heal and to fight infections  For more information and support to stop smoking:   · Smokefree  [employer] bio  Phone: 8- 418 - 227-0701  Web Address: Viewpost  How to Quit Using Smokeless Tobacco   Why it is important to stop using smokeless tobacco:  Smokeless tobacco comes in many forms  Examples include chew, snuff, dip, dissolvable tobacco, and snus  All smokeless tobacco products contain nicotine and may contain as much nicotine as 3 cigarettes  You may be physically dependent on nicotine  You may also be emotionally addicted to it  The cravings can be strong, but it is important to quit using smokeless tobacco  You will improve your health and decrease your cancer, stroke, and heart attack risk  Mouth sores and tooth problems will also improve when you quit  You can benefit from quitting no matter how long you have used smokeless tobacco    Prepare to stop using smokeless tobacco:  Nicotine is a highly addictive drug  Withdrawal symptoms can happen when you stop and make it hard to quit  The following can help keep you on track:  · Set a quit date  · Tell friends, family, and coworkers that you plan to quit  · Remove all smokeless tobacco products from your home, car, and workplace  Manage weight gain after you quit:  Nicotine can affect your metabolism  You may gain a few pounds after you quit  The following can help you control your weight:  · Eat healthy foods  · Drink water before, during, and between meals  · Exercise as directed  Weight Management   Why it is important to manage your weight:  Being overweight increases your risk of health conditions such as heart disease, high blood pressure, type 2 diabetes, and certain types of cancer   It can also increase your risk for osteoarthritis, sleep apnea, and other respiratory problems  Aim for a slow, steady weight loss  Even a small amount of weight loss can lower your risk of health problems  How to lose weight safely:  A safe and healthy way to lose weight is to eat fewer calories and get regular exercise  You can lose up about 1 pound a week by decreasing the number of calories you eat by 500 calories each day  Healthy meal plan for weight management:  A healthy meal plan includes a variety of foods, contains fewer calories, and helps you stay healthy  A healthy meal plan includes the following:  · Eat whole-grain foods more often  A healthy meal plan should contain fiber  Fiber is the part of grains, fruits, and vegetables that is not broken down by your body  Whole-grain foods are healthy and provide extra fiber in your diet  Some examples of whole-grain foods are whole-wheat breads and pastas, oatmeal, brown rice, and bulgur  · Eat a variety of vegetables every day  Include dark, leafy greens such as spinach, kale, andie greens, and mustard greens  Eat yellow and orange vegetables such as carrots, sweet potatoes, and winter squash  · Eat a variety of fruits every day  Choose fresh or canned fruit (canned in its own juice or light syrup) instead of juice  Fruit juice has very little or no fiber  · Eat low-fat dairy foods  Drink fat-free (skim) milk or 1% milk  Eat fat-free yogurt and low-fat cottage cheese  Try low-fat cheeses such as mozzarella and other reduced-fat cheeses  · Choose meat and other protein foods that are low in fat  Choose beans or other legumes such as split peas or lentils  Choose fish, skinless poultry (chicken or turkey), or lean cuts of red meat (beef or pork)  Before you cook meat or poultry, cut off any visible fat  · Use less fat and oil  Try baking foods instead of frying them  Add less fat, such as margarine, sour cream, regular salad dressing and mayonnaise to foods   Eat fewer high-fat foods  Some examples of high-fat foods include french fries, doughnuts, ice cream, and cakes  · Eat fewer sweets  Limit foods and drinks that are high in sugar  This includes candy, cookies, regular soda, and sweetened drinks  Exercise:  Exercise at least 30 minutes per day on most days of the week  Some examples of exercise include walking, biking, dancing, and swimming  You can also fit in more physical activity by taking the stairs instead of the elevator or parking farther away from stores  Ask your healthcare provider about the best exercise plan for you  © Copyright [address] 2018 Information is for End User's use only and may not be sold, redistributed or otherwise used for commercial purposes   All illustrations and images included in CareNotes® are the copyrighted property of A D A M [employer]  or [address] [place] [address]

## 2022-08-23 NOTE — PROGRESS NOTES
Assessment and Plan:     Problem List Items Addressed This Visit    None     Visit Diagnoses     Medicare annual wellness visit, initial    -  Primary    Dental arch anomaly        Blister on roof of mouth  Was seen in ED for concern of abscess  Patient rates pain 10/10 with difficulty eating  Treated with bedside I&D and clindamycin  Abrasions of multiple sites        Reoccuring hard scabs on legs which patient cuts off with a knife, bleeds  Patient advised on picking behavior and possibility of worsening pathology if he cont           Preventive health issues were discussed with patient, and age appropriate screening tests were ordered as noted in patient's After Visit Summary  Personalized health advice and appropriate referrals for health education or preventive services given if needed, as noted in patient's After Visit Summary  History of Present Illness:     Patient presents for a Medicare Wellness Visit    HPI   Patient reports current concerns include sores on his legs, frequent urination and dental abscess  Patient reports he has been noticing bumps that bleed and are persistent on his legs  Patient reports they feel like little hard balls underneath his skin  Patient reports he will pick or cut with a knife the hard scabs and then they will start bleeding  Patient reports he has been going to the bathroom more frequently and some polydipsia  Patient reports change in his diet with decreasing sugar and increasing his fruits and vegetable intake  Patient reports he is currently drinking approximately 1/2 gallon a day  Patient was recently seen in the ED for dental arch pain/blister which he received bedside I&D as well as short course of clindamycin  Patient denied any trauma or previous episodes  Patient reports today the pain is manageable       Patient Care Team:  Lula Lomas MD as PCP - General (Family Medicine)  Tani Yeh MD as PCP - [address] [place] [address] (RTE)  Tani Yeh MD as PCP - [facility] (RTE)  Michelle Martinez MD (Family Medicine)     Review of Systems:     Review of Systems     Problem List:     Patient Active Problem List   Diagnosis    Schizophrenia ([place] [id number] )    Agitation    Hyperthermia    Amphetamine use disorder, mild (HCC)    Hypokalemia    Tobacco abuse    Multiple sclerosis ([place] [address] )    Moderate major depression, single episode ([place] [address] )      Past Medical and Surgical History:     Past Medical History:   Diagnosis Date    Multiple sclerosis ([place] [address] )     Psychiatric disorder      Past Surgical History:   Procedure Laterality Date    HAND SURGERY        Family History:     Family History   Problem Relation Age of Onset    Diabetes Mother     Crohn's disease Father       Social History:     Social History     Socioeconomic History    Marital status: Single     Spouse name: None    Number of children: None    Years of education: None    Highest education level: None   Occupational History    None   Tobacco Use    Smoking status: Current Every Day Smoker     Packs/day: 0 50     Types: Cigarettes    Smokeless tobacco: Current User   Vaping Use    Vaping Use: Former   Substance and Sexual Activity    Alcohol use: No    Drug use: Not Currently     Comment: sts he quit drugs    Sexual activity: None   Other Topics Concern    None   Social History Narrative    ** Merged History Encounter **          Social Determinants of Health     Financial Resource Strain: Low Risk     Difficulty of Paying Living Expenses: Not very hard   Food Insecurity: Not on file   Transportation Needs: No Transportation Needs    Lack of Transportation (Medical): No    Lack of Transportation (Non-Medical):  No   Physical Activity: Not on file   Stress: Not on file   Social Connections: Not on file   Intimate Partner Violence: Not on file   Housing Stability: Not on file      Medications and Allergies:     Current Outpatient Medications   Medication Sig Dispense Refill    Lidocaine Viscous HCl (XYLOCAINE) 2 % mucosal solution Swish and spit 15 mL 4 (four) times a day as needed for mouth pain or discomfort 100 mL 0    Teriflunomide (Aubagio) 14 MG TABS Take 1 tablet (14 mg total) by mouth in the morning Starting June 17th  30 tablet 3    Teriflunomide (Aubagio) 7 MG TABS Take 1 tablet (7 mg total) by mouth in the morning For 2 weeks  14 tablet 0    Vraylar 3 MG capsule Take 3 mg by mouth daily      ARIPiprazole (ABILIFY) 10 mg tablet Take 10 mg by mouth daily (Patient not taking: No sig reported)      cariprazine (VRAYLAR) 1 5 MG capsule Take 1 5 mg by mouth daily (Patient not taking: No sig reported)      HM Nicotine Polacrilex 4 MG gum CHEW 1 EACH (4 MG TOTAL) AS NEEDED FOR SMOKING CESSATION 100 each 1    ibuprofen (MOTRIN) 800 mg tablet TAKE ONE TABLET BY MOUTH EVERY 8 HOURS WITH TYLENOL      nicotine polacrilex (COMMIT) 4 MG lozenge APPLY 1 LOZENGE (4 MG TOTAL) TO THE MOUTH OR THROAT AS NEEDED FOR SMOKING CESSATION 100 each 0    oxyCODONE (Roxicodone) 5 immediate release tablet Take 1 tablet (5 mg total) by mouth every 4 (four) hours as needed for moderate pain for up to 5 doses Max Daily Amount: 30 mg (Patient not taking: Reported on 8/23/2022) 5 tablet 0    QuickVue At-Home Covid-19 Test KIT Use as directed       No current facility-administered medications for this visit       No Known Allergies   Immunizations:     Immunization History   Administered Date(s) Administered    DTaP,unspecified 08/01/2017      Health Maintenance:         Topic Date Due    Hepatitis C Screening  Never done    HIV Screening  Never done    Colorectal Cancer Screening  Never done         Topic Date Due    COVID-19 Vaccine (1) Never done    Hepatitis A Vaccine (1 of 2 - Risk 2-dose series) Never done    Pneumococcal Vaccine: Pediatrics (0 to 5 Years) and At-Risk Patients (6 to 59 Years) (1 - PCV) Never done    Hepatitis B Vaccine (1 of 3 - Risk 3-dose series) Never done   Evelyn De Leon Influenza Vaccine (1) 09/01/2022      Medicare Screening Tests and Risk Assessments:     Minna Ramsey is here for his Initial Wellness visit  Health Risk Assessment:   Patient rates overall health as good  Patient feels that their physical health rating is same  Patient is satisfied with their life  Eyesight was rated as same  Hearing was rated as same  Patient feels that their emotional and mental health rating is same  Patients states they are never, rarely angry  Patient states they are often unusually tired/fatigued  Pain experienced in the last 7 days has been some  Patient's pain rating has been 4/10  Patient states that he has experienced weight loss or gain in last 6 months  Fall Risk Screening: In the past year, patient has experienced: no history of falling in past year      Home Safety:  Patient does not have trouble with stairs inside or outside of their home  Patient has working smoke alarms and has working carbon monoxide detector  Home safety hazards include: none  Nutrition:   Current diet is Regular  Medications:   Patient is not currently taking any over-the-counter supplements  Patient is able to manage medications  Activities of Daily Living (ADLs)/Instrumental Activities of Daily Living (IADLs):   Walk and transfer into and out of bed and chair?: Yes  Dress and groom yourself?: Yes    Bathe or shower yourself?: Yes    Feed yourself?  Yes  Do your laundry/housekeeping?: Yes  Manage your money, pay your bills and track your expenses?: Yes  Make your own meals?: Yes    Do your own shopping?: Yes    Previous Hospitalizations:   Any hospitalizations or ED visits within the last 12 months?: No      Advance Care Planning:   Living will: No    Durable POA for healthcare: No    Advanced directive: No      PREVENTIVE SCREENINGS        Diabetes Screening:     General: Screening Current      Prostate Cancer Screening:    General: Screening Not Indicated      Abdominal Aortic Aneurysm (AAA) Screening:    Risk factors include: tobacco use        Lung Cancer Screening:     General: Screening Not Indicated    Screening, Brief Intervention, and Referral to Treatment (SBIRT)    Screening  Typical number of drinks in a day: 0  Typical number of drinks in a week: 0  Interpretation: Low risk drinking behavior      No exam data present     Physical Exam:     /60 (BP Location: Left arm, Patient Position: Sitting, Cuff Size: Standard)   Pulse (!) 128   Temp 98 6 °F (37 °C) (Tympanic)   Resp 16   Ht 6' 2" (1 88 m)   Wt 109 kg (240 lb 1 6 oz)   SpO2 96%   BMI 30 83 kg/m²     Physical Exam     Peyton Knott MD

## 2022-09-27 ENCOUNTER — TELEPHONE (OUTPATIENT)
Dept: NEUROLOGY | Facility: CLINIC | Age: 47
End: 2022-09-27

## 2022-09-28 ENCOUNTER — OFFICE VISIT (OUTPATIENT)
Dept: NEUROLOGY | Facility: CLINIC | Age: 47
End: 2022-09-28
Payer: MEDICARE

## 2022-09-28 VITALS
DIASTOLIC BLOOD PRESSURE: 88 MMHG | SYSTOLIC BLOOD PRESSURE: 127 MMHG | OXYGEN SATURATION: 96 % | TEMPERATURE: 97.5 F | BODY MASS INDEX: 32.47 KG/M2 | WEIGHT: 253 LBS | HEIGHT: 74 IN | HEART RATE: 88 BPM

## 2022-09-28 DIAGNOSIS — G47.9 SLEEP DISTURBANCE: ICD-10-CM

## 2022-09-28 DIAGNOSIS — E55.9 VITAMIN D DEFICIENCY: ICD-10-CM

## 2022-09-28 DIAGNOSIS — F25.9 SCHIZO AFFECTIVE SCHIZOPHRENIA (HCC): ICD-10-CM

## 2022-09-28 DIAGNOSIS — L98.9 SKIN LESIONS: ICD-10-CM

## 2022-09-28 DIAGNOSIS — Z00.00 VISIT FOR PREVENTIVE HEALTH EXAMINATION: ICD-10-CM

## 2022-09-28 DIAGNOSIS — G35 MULTIPLE SCLEROSIS (HCC): Primary | ICD-10-CM

## 2022-09-28 DIAGNOSIS — R13.10 DYSPHAGIA: ICD-10-CM

## 2022-09-28 PROCEDURE — 99215 OFFICE O/P EST HI 40 MIN: CPT | Performed by: PSYCHIATRY & NEUROLOGY

## 2022-09-28 RX ORDER — ERGOCALCIFEROL 1.25 MG/1
50000 CAPSULE ORAL WEEKLY
Qty: 8 CAPSULE | Refills: 2 | Status: SHIPPED | OUTPATIENT
Start: 2022-09-28

## 2022-09-28 NOTE — PROGRESS NOTES
Return NeuroOutpatient Note        Malorie Scott  [id number]  42 y o   1975       Multiple Sclerosis        History obtained from:  Patient     HPI/Subjective:    Malorie Scott is a 50 yo M with PMH of MS presents as f/u  Per my previous history, he has known about MS since at least  2010  Patient was dx with MS in [place] about 15 years ago  It had started out as right facial numbness  Per my previous history, patient  was on Rebiff for sometime but it was making him sick so stopped it  Patient was not seeing neurologist regularly  Patient doesn't have much physical limitations  He can't climb ladder  He walks independently and drives  He will occasionally get numbness in feet but his sxs are tolerable  His recent mri brain from 6/28/21  revealed new white matter lesion in the right frontal centrum semiovale with associated enhancement, indicative of an actively demyelinating focus in this patient with history of multiple sclerosis  When we finally got a hold of him, he agreed to start oral DMD  We had started him on tecfidera  He was getting bumpy spotches on his body  He would feel like his hair was growing type of sensation which would last 2-3 hrs after dose  Then we had started him on copaxone generic but from this he gets red blotches, extreme fatigue, flu like symptoms  At injection site, he would get 'knots' and they would last a long time  We had ordered for aubagio prior to this but his insurance hadn't covered  He had tried Rebiff 10 years ago  He had achy, flu like symptoms  We were finally able to approve him of Aubagio and he's on 14mg daily and is tolerating it fairly       His Vit D level was low at 21  He's prescribed but hasn't been taking them      Today patient has other non neurological symptoms that he needs to discuss as he doesn't have PCP and doesn't like recommendations from last visit in Aug    He had incision and drainage of abscess in mouth   It's taken care of now  He also reports burping and sensation of something stuck in his throat, chest      He reports problem maintaining sleep  He may get 5-6 hrs a night  He is not sure about snoring  He is dx with schizoaffective, bipolar disorder  he says that his psychiatrist doesn't discuss his condition with him       He was on vraylar that was helping with paranoia but he was dull and couldn't function much           Past Medical History:   Diagnosis Date    Multiple sclerosis ([facility] [address] )     Psychiatric disorder      Social History     Socioeconomic History    Marital status: Single     Spouse name: Not on file    Number of children: Not on file    Years of education: Not on file    Highest education level: Not on file   Occupational History    Not on file   Tobacco Use    Smoking status: Current Every Day Smoker     Packs/day: 0 50     Types: Cigarettes    Smokeless tobacco: Current User   Vaping Use    Vaping Use: Former   Substance and Sexual Activity    Alcohol use: No    Drug use: Not Currently     Comment: sts he quit drugs    Sexual activity: Not on file   Other Topics Concern    Not on file   Social History Narrative    ** Merged History Encounter **          Social Determinants of Health     Financial Resource Strain: Low Risk     Difficulty of Paying Living Expenses: Not very hard   Food Insecurity: Not on file   Transportation Needs: No Transportation Needs    Lack of Transportation (Medical): No    Lack of Transportation (Non-Medical):  No   Physical Activity: Not on file   Stress: Not on file   Social Connections: Not on file   Intimate Partner Violence: Not on file   Housing Stability: Not on file     Family History   Problem Relation Age of Onset    Diabetes Mother     Crohn's disease Father      No Known Allergies  Current Outpatient Medications on File Prior to Visit   Medication Sig Dispense Refill    nicotine polacrilex (COMMIT) 4 MG lozenge APPLY 1 LOZENGE (4 MG TOTAL) TO THE MOUTH OR THROAT AS NEEDED FOR SMOKING CESSATION 100 each 0    Teriflunomide (Aubagio) 14 MG TABS Take 1 tablet (14 mg total) by mouth in the morning Starting June 17th 30 tablet 3    ARIPiprazole (ABILIFY) 10 mg tablet Take 10 mg by mouth daily (Patient not taking: No sig reported)      cariprazine (VRAYLAR) 1 5 MG capsule Take 1 5 mg by mouth daily (Patient not taking: No sig reported)      HM Nicotine Polacrilex 4 MG gum CHEW 1 EACH (4 MG TOTAL) AS NEEDED FOR SMOKING CESSATION (Patient not taking: Reported on 9/28/2022) 100 each 1    ibuprofen (MOTRIN) 800 mg tablet TAKE ONE TABLET BY MOUTH EVERY 8 HOURS WITH TYLENOL (Patient not taking: Reported on 9/28/2022)      Lidocaine Viscous HCl (XYLOCAINE) 2 % mucosal solution Swish and spit 15 mL 4 (four) times a day as needed for mouth pain or discomfort (Patient not taking: Reported on 9/28/2022) 100 mL 0    oxyCODONE (Roxicodone) 5 immediate release tablet Take 1 tablet (5 mg total) by mouth every 4 (four) hours as needed for moderate pain for up to 5 doses Max Daily Amount: 30 mg (Patient not taking: No sig reported) 5 tablet 0    QuickVue At-Home Covid-19 Test KIT Use as directed (Patient not taking: Reported on 9/28/2022)      Teriflunomide (Aubagio) 7 MG TABS Take 1 tablet (7 mg total) by mouth in the morning For 2 weeks  (Patient not taking: Reported on 9/28/2022) 14 tablet 0    Vraylar 3 MG capsule Take 3 mg by mouth daily (Patient not taking: Reported on 9/28/2022)       No current facility-administered medications on file prior to visit  Review of Systems   Refer to positive review of systems in HPI     Review of Systems    Constitutional- No fever  Eyes- No visual change  ENT- Hearing normal  CV- No chest pain  Resp- No Shortness of breath  GI- No diarrhea  - Bladder normal  MS- No Arthritis   Skin- No rash  Psych- + anxiety, depression  Endo- No DM  Heme- No nodes    Vitals:    09/28/22 1142   BP: 127/88   BP Location: Left arm   Patient Position: Sitting   Cuff Size: Standard   Pulse: 88   Temp: 97 5 °F (36 4 °C)   TempSrc: Tympanic   SpO2: 96%   Weight: 115 kg (253 lb)   Height: 6' 2" (1 88 m)       PHYSICAL EXAM:  Appearance: No Acute Distress  Ophthalmoscopic: Disc Flat, Normal fundus  Mental status:  Orientation: Awake, Alert, and Orientedx3  Memory: Registation 3/3 Recall 2/3  Attention: normal  Knowledge: good  Language: No aphasia  Speech: No dysarthria  Cranial Nerves:  2 No Visual Defect on Confrontation, Pupils round, equal, reactive to light  3,4,6 Extraocular Movements Intact, no nystagmus  5 Facial Sensation Intact  7 No facial asymmetry  8 Intact hearing  9,10 Palate symmetric, normal gag  11 Good shoulder shrug  12 Tongue Midline  Gait: Stable  Coordination: No ataxia with finger to nose testing, and heel to shin  Sensory: Intact, Symmetric to pinprick, light touch, vibration, and joint position  Muscle Tone: Normal              Muscle exam:  Arm Right Left Leg Right Left   Deltoid 5/5 5/5 Iliopsoas 5/5 5/5   Biceps 5/5 5/5 Quads 5/5 5/5   Triceps 5/5 5/5 Hamstrings 5/5 5/5   Wrist Extension 5/5 5/5 Ankle Dorsi Flexion 5/5 5/5   Wrist Flexion 5/5 5/5 Ankle Plantar Flexion 5/5 5/5   Interossei 5/5 5/5 Ankle Eversion 5/5 5/5   APB 5/5 5/5 Ankle Inversion 5/5 5/5       Reflexes   RJ BJ TJ KJ AJ Plantars Subramanian's   Right 2+ 2+ 2+ 1+  Downgoing Not present   Left 2+ 2+ 2+ 1+  Downgoing Not present     Personal review of  Labs:                    Diagnoses and all orders for this visit:      1  Multiple sclerosis ([address] )     2  Sleep disturbance  Ambulatory Referral to Sleep Medicine   3  Dysphagia  FL barium swallow video w speech   4  Skin lesions  HIV 1/2 ANTIGEN/ANTIBODY (4TH GENERATION) W REFLEX [facility]   5  Vitamin D deficiency  ergocalciferol (VITAMIN D2) 50,000 units   6  Visit for preventive health examination  [facility] to [facility]   7  Schizo affective schizophrenia [facility])         Patient has remained stable     He is to resume Aubagio 14mg as DMD for MS  Will obtain MBBS for dysphagia as he doesn't have PCP that he will return to at this time  Will arrange having him established with another PCP for his various issues  One of his main problem is also untreated schizoaffective disorder and needs a good psychiatrist  We are hoping PCP can help with that  He is to resume vit D supplements  Will refer him to sleep center for insomnia  Physically he's in good condition in terms of MS                 Total time of encounter:  45 min  More than 50% of the time was used in counseling and/or coordination of care  Extent of counseling and/or coordination of care        MD [facility]  [address]  [address]  [phone]

## 2022-09-29 LAB — HIV 1+2 AB+HIV1 P24 AG SERPL QL IA: NON REACTIVE

## 2022-10-20 ENCOUNTER — OFFICE VISIT (OUTPATIENT)
Dept: SLEEP CENTER | Facility: CLINIC | Age: 47
End: 2022-10-20
Payer: MEDICARE

## 2022-10-20 VITALS
WEIGHT: 244.2 LBS | HEART RATE: 104 BPM | DIASTOLIC BLOOD PRESSURE: 80 MMHG | HEIGHT: 74 IN | SYSTOLIC BLOOD PRESSURE: 124 MMHG | BODY MASS INDEX: 31.34 KG/M2 | OXYGEN SATURATION: 96 %

## 2022-10-20 DIAGNOSIS — E66.9 OBESITY (BMI 30-39.9): ICD-10-CM

## 2022-10-20 DIAGNOSIS — F15.10 AMPHETAMINE USE DISORDER, MILD (HCC): ICD-10-CM

## 2022-10-20 DIAGNOSIS — Z72.0 TOBACCO ABUSE: ICD-10-CM

## 2022-10-20 DIAGNOSIS — G47.19 EXCESSIVE DAYTIME SLEEPINESS: ICD-10-CM

## 2022-10-20 DIAGNOSIS — F20.0 PARANOID SCHIZOPHRENIA (HCC): ICD-10-CM

## 2022-10-20 DIAGNOSIS — G47.09 OTHER INSOMNIA: ICD-10-CM

## 2022-10-20 DIAGNOSIS — F32.1 MODERATE MAJOR DEPRESSION, SINGLE EPISODE (HCC): ICD-10-CM

## 2022-10-20 DIAGNOSIS — G47.33 OSA (OBSTRUCTIVE SLEEP APNEA): Primary | ICD-10-CM

## 2022-10-20 DIAGNOSIS — G47.61 PLMD (PERIODIC LIMB MOVEMENT DISORDER): ICD-10-CM

## 2022-10-20 DIAGNOSIS — G35 MULTIPLE SCLEROSIS (HCC): ICD-10-CM

## 2022-10-20 PROCEDURE — 99204 OFFICE O/P NEW MOD 45 MIN: CPT | Performed by: INTERNAL MEDICINE

## 2022-10-20 NOTE — PATIENT INSTRUCTIONS
What you can do to improve your sleep: (Sleep Hygiene) Basic rules for a good night's sleep    Create a regular sleep schedule  This will help you form a sleep routine  Keep a record of your sleep patterns, and any sleeping problems you have  Bring the record to follow-up visits with healthcare providers  Avoid prolonged use of light-emitting screens before bedtime or watching TV in bed  Avoid forcing sleep  Do not take naps  Naps could make it hard for you to fall asleep at bedtime  Deal with your worries before bedtime  Keep your bedroom cool, quiet, and dark  Turn on white noise, such as a fan, to help you relax  Do not use your bed for any activity that will keep you awake  Do not read, exercise, eat, or watch TV in your bedroom  Get up if you do not fall asleep within 20 minutes  Move to another room and do something relaxing until you become sleepy  Limit caffeine, alcohol, nicotine and food to earlier in the day  Only drink caffeine in the morning  Do not drink alcohol within 6 hours of bedtime  Do not eat a heavy meal right before you go to bed  Avoid smoking, especially in the evening  Exercise regularly  Daily exercise will help you sleep better  Do not exercise within 4 hours of bedtime  Stimulus control therapy rules  1  Go to bed only when sleepy  2  Do not watch television, read, eat, or worry while in bed  Use bed only for sleep and sex  3  Get out of bed if unable to fall asleep within 20 minutes and go to another room  Return to bed only when sleepy  Repeat this step as many times as necessary throughout the night  4  Set an alarm clock to wake up at a fixed time each morning, including weekends  5  Do not take a nap during the day  Data from: [address] [place] [address], [address] [employer] Nonpharmacologic treatments of insomnia  J Clin Psychiatry 7046; 53:37  Go to AASM website for more information: Sleepeducation  org     Recommended Reading:  Book by authors Vivek Senior No More sleepless nights    What is SANDEEP? Obstructive sleep apnea is a common and serious sleep disorder that causes you to stop breathing during sleep  The airway repeatedly becomes blocked, limiting the amount of air that reaches your lungs  When this happens, you may snore loudly or making choking noises as you try to breathe  Your brain and body becomes oxygen deprived and you may wake up  This may happen a few times a night, or in more severe cases, several hundred times a night  Sleep apnea can make you wake up in the morning feeling tired or unrefreshed even though you have had a full night of sleep  During the day, you may feel fatigued, have difficulty concentrating or you may even unintentionally fall asleep  This is because your body is waking up numerous times throughout the night, even though you might not be conscious of each awakening  The lack of oxygen your body receives can have negative long-term consequences for your health  This includes:  High blood pressure  Heart disease  Irregular heart rhythms  Stroke  Pre-diabetes and diabetes  Depression    Testing  An objective evaluation of your sleep may be needed before your board certified sleep physician can make a diagnosis  Options include:   In-lab overnight sleep study  This type of sleep study requires you to stay overnight at a sleep center, in a bed that may resemble a hotel room  You will sleep with sensors hooked up to various parts of your body  These sensors record your brain waves, heartbeat, breathing and movement  An overnight sleep study also provides your doctor with the most complete information about your sleep  Learn more about an overnight sleep study       Home sleep apnea test  Some patients with high risk factors for obstructive sleep apnea and no other medical disorders may be candidates for a home sleep apnea test  The testing equipment differs in that it is less complicated than what is used in an overnight sleep study  As such, does not give all the data an in-lab will and if negative, may not mean you do not have the problem  Treatment for sleep apnea  includes using a continuous positive airway pressure (CPAP) machine to keep your airway open during sleep  A mask is placed over your nose and mouth, or just your nose  The mask is hooked to the CPAP machine that blows a gentle stream of air into the mask when you breathe  This helps keep your airway open so you can breathe more regularly  Extra oxygen may be given to you through the machine  You may be given a mouth device  It looks like a mouth guard or dental retainer and stops your tongue and mouth tissues from blocking your throat while you sleep  Surgery may be needed to remove extra tissues that block your mouth, throat, or nose  Manage sleep apnea:   Do not smoke  Nicotine and other chemicals in cigarettes and cigars can cause lung damage  Ask your healthcare provider for information if you currently smoke and need help to quit  E-cigarettes or smokeless tobacco still contain nicotine  Talk to your healthcare provider before you use these products  Do not drink alcohol or take sedative medicine before you go to sleep  Alcohol and sedatives can relax the muscles and tissues around your throat  This can block the airflow to your lungs  Maintain a healthy weight  Excess tissue around your throat may restrict your breathing  Ask your healthcare provider for information if you need to lose weight  Sleep on your side or use pillows designed to prevent sleep apnea  This prevents your tongue or other tissues from blocking your throat  You can also raise the head of your bed  Driving Safety  Refrain from driving when drowsy  Follow up with your healthcare provider as directed:  Write down your questions so you remember to ask them during your visits  Go to AASM website for more information: Sleepeducation  org     What is SANDEEP?    Obstructive sleep apnea is a common and serious sleep disorder that causes you to stop breathing during sleep  The airway repeatedly becomes blocked, limiting the amount of air that reaches your lungs  When this happens, you may snore loudly or making choking noises as you try to breathe  Your brain and body becomes oxygen deprived and you may wake up  This may happen a few times a night, or in more severe cases, several hundred times a night  Sleep apnea can make you wake up in the morning feeling tired or unrefreshed even though you have had a full night of sleep  During the day, you may feel fatigued, have difficulty concentrating or you may even unintentionally fall asleep  This is because your body is waking up numerous times throughout the night, even though you might not be conscious of each awakening  The lack of oxygen your body receives can have negative long-term consequences for your health  This includes:  High blood pressure  Heart disease  Irregular heart rhythms  Stroke  Pre-diabetes and diabetes  Depression    Testing  An objective evaluation of your sleep may be needed before your board certified sleep physician can make a diagnosis  Options include:   In-lab overnight sleep study  This type of sleep study requires you to stay overnight at a sleep center, in a bed that may resemble a hotel room  You will sleep with sensors hooked up to various parts of your body  These sensors record your brain waves, heartbeat, breathing and movement  An overnight sleep study also provides your doctor with the most complete information about your sleep  Learn more about an overnight sleep study  Home sleep apnea test  Some patients with high risk factors for obstructive sleep apnea and no other medical disorders may be candidates for a home sleep apnea test  The testing equipment differs in that it is less complicated than what is used in an overnight sleep study   As such, does not give all the data an in-lab will and if negative, may not mean you do not have the problem  Treatment for sleep apnea  includes using a continuous positive airway pressure (CPAP) machine to keep your airway open during sleep  A mask is placed over your nose and mouth, or just your nose  The mask is hooked to the CPAP machine that blows a gentle stream of air into the mask when you breathe  This helps keep your airway open so you can breathe more regularly  Extra oxygen may be given to you through the machine  You may be given a mouth device  It looks like a mouth guard or dental retainer and stops your tongue and mouth tissues from blocking your throat while you sleep  Surgery may be needed to remove extra tissues that block your mouth, throat, or nose  Manage sleep apnea:   Do not smoke  Nicotine and other chemicals in cigarettes and cigars can cause lung damage  Ask your healthcare provider for information if you currently smoke and need help to quit  E-cigarettes or smokeless tobacco still contain nicotine  Talk to your healthcare provider before you use these products  Do not drink alcohol or take sedative medicine before you go to sleep  Alcohol and sedatives can relax the muscles and tissues around your throat  This can block the airflow to your lungs  Maintain a healthy weight  Excess tissue around your throat may restrict your breathing  Ask your healthcare provider for information if you need to lose weight  Sleep on your side or use pillows designed to prevent sleep apnea  This prevents your tongue or other tissues from blocking your throat  You can also raise the head of your bed  Driving Safety  Refrain from driving when drowsy  Follow up with your healthcare provider as directed:  Write down your questions so you remember to ask them during your visits  Go to AAS website for more information: Sleepeducation  org             Nursing Support:  When: Monday through Friday 7A-5PM except holidays  Where: Our direct line is [phone]  If you are having a true emergency please call 911  In the event that the line is busy or it is after hours please leave a voice message and we will return your call  Please speak clearly, leaving your full name, birth date, best number to reach you and the reason for your call  Medication refills: We will need the name of the medication, the dosage, the ordering provider, whether you get a 30 or 90 day refill, and the pharmacy name and address  Medications will be ordered by the provider only  Nurses cannot call in prescriptions  Please allow 7 days for medication refills  Physician requested updates: If your provider requested that you call with an update after starting medication, please be ready to provide us the medication and dosage, what time you take your medication, the time you attempt to fall asleep, time you fall asleep, when you wake up, and what time you get out of bed  Sleep Study Results: We will contact you with sleep study results and/or next steps after the physician has reviewed your testing

## 2022-10-20 NOTE — PROGRESS NOTES
Consultation - [facility]  49 y o  male  :1975  VXH:[id number]  DOS:10/20/2022    Physician Requesting Consult: Laly Easley MD             Reason for Consult : At your kind request I saw Shiva Kessler for initial sleep evaluation today  Patient is here for a complaint of Complaint of insomnia      PFSH, Problem List, Medications & Allergies were reviewed in EMR  Jose Manuel Manzano  has a past medical history of Multiple sclerosis ([address] ) and Psychiatric disorder  He has a current medication list which includes the following prescription(s): ergocalciferol, nicotine polacrilex, aubagio, aripiprazole, cariprazine, hm nicotine polacrilex, ibuprofen, lidocaine viscous hcl, oxycodone, quickvue at-home covid-19 test, aubagio, and vraylar  HPI:  He has been experiencing sleep difficulties of at least a years duration  He has difficulty both initiating and maintaining sleep  Presently he sleeps alone and is not aware of snoring or breathing difficulties during sleep  Other Complaints:  Awakens with sensation of Cookie Cable is a ball in my chest  and has to burp for relief  Restless Leg Syndrome: jerking movements during sleep that has under him the nickname of “wiggle"  Parasomnia: no features reported    Sleep Routine (on average):   Typical Bedtime:  Midnight Gets OOB:  8:00 a m  TIB:8 hrs  Sleep latency: several hours  He has racing thoughts but is not using electronics in the bedroom or clock watching Sleep Interruptions:1-2/nite because of nocturia and able to fall back asleep  Awakens: spontaneously  Upon awakening: is not always refreshed   He estimates getting 5-6 hrs sleep  Jose Manuel Manzano reports Excessive Daytime Sleepiness  takes planned naps daily for approximately hour and a half  He rated himself at Total score: 9 /24 on the [place] Sleepiness Scale  Habits:  reports that he has been smoking cigarettes  He has been smoking about 0 50 packs per day   He uses smokeless tobacco  ;   E-Cigarette/Vaping   • E-Cigarette Use Former User     ;  reports previous drug use ;  reports no history of alcohol use  ; Caffeine use:limited ; Exercise routine: none   Family History: Negative for sleep disturbance  ROS - reviewed and as attached  Significant for around 60 lb weight gain in the past 2 year  He reported no nasal symptoms  He reports shortness of breath and wheezing  He reports some chest pressure  He is on medication for his psychiatric condition and continues to have anxiety  EXAM:  /80   Pulse 104   Ht 6' 2" (1 88 m)   Wt 111 kg (244 lb 3 2 oz)   SpO2 96%   BMI 31 35 kg/m²    General: Well groomed male, well appearing, in no apparent distress  Neurological: Alert and orientated;  cooperative; Cranial nerves intact;    Psychiatric: Speech:clear and coherent;  appears anxious, otherwise Normal affect & thought   Skin: warm and dry; Color& Hydration good; no facial rashes or lesions   HEENT:  Craniofacial anatomy: normal Sinuses: non- tender  TMJ: Normal    Eyes: EOM's intact;  conjunctiva/corneas clear   Ears: Externallyappear normal     Nasal Airway: assymetric nares Septum:  Deviated; Mucosa: normal; Turbinates: normal; Rhinorrhea: None   Mouth: Lips: normal posture; Dentition: normal   Mucosa:moist  ; Hard Palate:narrow   Oropharryx: crowded and AP narrowing Tongue: Mallampati:Class IV and MobileSoft Palate:  redundant  Tonsils: absent  Neck:is thick;   "; Supple; no abnormal masses; Thyroid:normal  Trachea:central     Lymph: No Cervical or Submandibular Lymhadenopathy  Heart: S1,S2 soft; RRR; tachycardia; no murmurs   Lungs: Respiratory Effort:normal  Air entry decreased bilaterally  No wheezes  No rales  Abdomen: Obese, Soft & non-tender    Extremities: No pedal edema  No clubbing or cyanosis      Musculoskeletal:  Motor normal; Gait:normal        IMPRESSION: Primary/Secondary Sleep Diagnoses (to Medical or Psych conditions) & Comorbidities 1  SANDEEP (obstructive sleep apnea)  Diagnostic Sleep Study    CPAP Study   2  Other insomnia  Ambulatory Referral to Sleep Medicine   3  PLMD (periodic limb movement disorder)     4  Excessive daytime sleepiness     5  Tobacco abuse     6  Multiple sclerosis ([facility] )     7  Paranoid schizophrenia ([facility] )     8  Moderate major depression, single episode ([facility] )     9  Amphetamine use disorder, mild ([facility] )     10  Obesity (BMI 30-39  9)          PLAN:   With respect to above conditions, comprehensive counseling provided on pathophysiology; effects on symptoms and comorbidities, diagnostic strategies & limitations; treatment options; risks or no treament; risks & benefits of the various therapeutic options; costs and insurance aspects  I advised weight reduction, avoiding sleeping supine, using alcohol or sedating medications close to bed time and on safe driving practices  Nocturnal polysomnography is indicated and a diagnostic study will be scheduled  Patient is willing to try Positive airway pressure therapy and will be scheduled for a titration study if indicated  Multi component Cognitive behavioral therapy for Insomnia undertaken - Sleep Restriction, Stimulus control, Relaxation techniques and Sleep hygiene were discussed  I advised smoking cessation and he is working at it  Follow-up to be scheduled after the studies to review results, further details of treatment options and to initiate/adjust therapy  Sincerely,      Authenticated electronically on 12/35/65   Board Certified Specialist     Portions of the record may have been created with voice recognition software  Occasional wrong word or "sound a like" substitutions may have occurred due to the inherent limitations of voice recognition software  There may also be notations and random deletions of words or characters from malfunctioning software  Read the chart carefully and recognize, using context, where substitutions/deletions have occurred

## 2022-10-20 NOTE — PROGRESS NOTES
Review of Systems      Genitourinary need to urinate more than twice a night   Cardiology Frequent chest pain or angina,    Gastrointestinal none   Neurology need to move extremities, numbness/tingling of an extremity, forgetfulness and balance problems   Constitutional weight change   Integumentary none   Psychiatry anxiety and mood change   Musculoskeletal legs twitching/jerking and leg cramps   Pulmonary shortness of breath with activity and wheezing   ENT none   Endocrine frequent urination   Hematological none

## 2022-10-24 ENCOUNTER — HOSPITAL ENCOUNTER (OUTPATIENT)
Dept: RADIOLOGY | Facility: HOSPITAL | Age: 47
Discharge: HOME | End: 2022-10-24
Attending: PSYCHIATRY & NEUROLOGY
Payer: MEDICARE

## 2022-10-24 DIAGNOSIS — R13.10 DYSPHAGIA: ICD-10-CM

## 2022-10-24 PROCEDURE — 74230 X-RAY XM SWLNG FUNCJ C+: CPT

## 2022-10-24 PROCEDURE — 92611 MOTION FLUOROSCOPY/SWALLOW: CPT

## 2022-10-24 NOTE — PROCEDURES
Speech Pathology Videofluoroscopic Swallow Study (VFSS/VBSS/MBSS)      Patient Name: Patt Hernandez    [id number] Date: 10/24/2022     Past Medical History  Past Medical History:   Diagnosis Date   • Multiple sclerosis [facility])    • Psychiatric disorder        Past Surgical History  Past Surgical History:   Procedure Laterality Date   • HAND SURGERY           General Information;  Carlitos Hernandez is a 49 y o  male with a PMH remarkable for MS (diagnosied ~15 years ago when he began with R facial numbness  Brain MRI of 6/28/21 revealed  1   "New white matter lesion in the right frontal centrum semiovale with associated enhancement is indicative of an actively demyelinating focus in this patient with history of multiple sclerosis  Further clinical assessment advised  2   Mild to moderate supratentorial plaque burden elsewhere in the brain is re-demonstrated)  Current concerns for dysphagia include burping and a sensation of something stuck in his chest > throat     A VFSS was recommended to assess oropharyngeal stage swallowing skills at this time  Umm Romero was viewed in lateral position and assessed with thin liquid, nectar thick liquid, puree, soft moist food/fig bar mixed with liquid), solid food (Cassie Doone cookie) and a13mm pill with thin liquid  Oral stage:  No significant oral stage dysphagia  Mastication was timely/prompt and effective with materials administered today  Bolus formation and transfer were functional with no significant oral residue (mild lingual coating with barium paste) Oral control was also functional with mild spillage over the base of the tongue with the initial teaspoon of thin liquid and mixed food with thin liquid (no spillage occured with any other materials and despite    Pharyngeal stage:  No significant pharyngeal dysphagia  Velar elevation noted  Swallowing initiation was prompt    Hyo-laryngeal excursion and aIrway entrance closure appeared adequate  Tongue base retraction appeared to be of adequate strength  Pharyngeal constriction suspected/presumed to be adequate  PES opening was adequate  Management of food/liquid/barium tablet follows: All food, liquid and the barium tablet passed through the pharynx safely and efficiently (ie no laryngeal penetration, aspiration or significant pharyngeal residue noted today)  Esophageal stage:  Brief view of esophagus was completed after administration of the barium tablet and no stasis of pill/food/liquid identified  Assessment Summary:    Balta Martinez presented with no significant symptoms of oropharyngeal dysphagia characterized today  I am uncertain if he is experiencing occasions of "referred sensation" from episodes of reflux versus other (?rapid intake, swallowing of excess air/aerophagia)  Note: Images are available for review in PACS as desired  NOMS (National Outcome Measurement System): Swallowing "Score"  LEVEL 7: The individual’s ability to eat independently is not limited by swallow function  Swallowing would be safe and efficient for all consistencies  Compensatory strategies are effectively used when needed    Recommendations:   Recommended Diet:  regular diet and thin liquids   Recommended Form of Medications: as desired/best tolerated     SLP Dysphagia therapy recommended: No, none indicated at this time    Thank you for this referral   Please do not hesitate to contact me with any questions or concerns      Italia Bowser [facility]   Speech Pathologist  [place] License [id number]  [place] License [id number]  Available via [employer]

## 2022-10-25 ENCOUNTER — HOSPITAL ENCOUNTER (OUTPATIENT)
Dept: SLEEP CENTER | Facility: CLINIC | Age: 47
Discharge: HOME | End: 2022-10-25
Payer: MEDICARE

## 2022-10-25 DIAGNOSIS — G47.33 OSA (OBSTRUCTIVE SLEEP APNEA): ICD-10-CM

## 2022-10-25 PROCEDURE — 95810 POLYSOM 6/> YRS 4/> PARAM: CPT

## 2022-10-25 NOTE — PROGRESS NOTES
Sleep Study Documentation    Pre-Sleep Study       Sleep testing procedure explained to patient:YES    Patient napped prior to study:NO    Caffeine:Dayshift worker after 12PM   Caffeine use:YES- coffee  6 to 18 ounces     Alcohol:Dayshift workers after 5PM: Alcohol use:NO    Typical day for patient:YES       Study Documentation    Sleep Study Indications:     Sleep Study: Diagnostic   Snore: Moderate  Supplemental O2: no    O2 flow rate (L/min) range   O2 flow rate (L/min) final   Minimum SaO2 87%  Baseline SaO2 93 2%    EKG abnormalities: no     EEG abnormalities: no    Sleep Study Recorded < 2 hours: N/A    Sleep Study Recorded > 2 hours but incomplete study: N/A    Sleep Study Recorded 6 hours but no sleep obtained: NO    Patient classification: employed       Post-Sleep Study    Medication used at bedtime or during sleep study:NO    Patient reports time it took to fall asleep:20 to 30 minutes    Patient reports waking up during study:1 to 2 times  Patient reports returning to sleep in 10 to 30 minutes  Patient reports sleeping 4 to 6 hours without dreaming  Patient reports sleep during study:better than usual    Patient rated sleepiness: Somewhat sleepy or tired    PAP treatment:no

## 2022-10-26 DIAGNOSIS — G47.09 OTHER INSOMNIA: Primary | ICD-10-CM

## 2022-10-26 RX ORDER — ZOLPIDEM TARTRATE 5 MG/1
5 TABLET ORAL AS NEEDED
Qty: 1 TABLET | Refills: 0 | Status: SHIPPED | OUTPATIENT
Start: 2022-10-26 | End: 2023-05-04 | Stop reason: ALTCHOICE

## 2022-10-27 DIAGNOSIS — G35 MULTIPLE SCLEROSIS (HCC): ICD-10-CM

## 2022-10-27 RX ORDER — RENAGEL 800 MG/1
TABLET ORAL
Qty: 30 TABLET | Refills: 2 | Status: SHIPPED | OUTPATIENT
Start: 2022-10-27

## 2022-11-08 ENCOUNTER — TELEPHONE (OUTPATIENT)
Dept: SLEEP CENTER | Facility: CLINIC | Age: 47
End: 2022-11-08

## 2022-11-08 NOTE — TELEPHONE ENCOUNTER
Called patient  Left message to advise that in-lab diagnostic polysomnogram shows mild SANDEEP with hypoxia  CPAP study scheduled prior to in-lab diagnostic study for 2/8/2023  Advised to contact the nursing staff if he would like to be added to the cancellation list for sooner study date

## 2022-11-17 ENCOUNTER — TELEPHONE (OUTPATIENT)
Dept: OTHER | Facility: OTHER | Age: 47
End: 2022-11-17

## 2022-11-17 NOTE — TELEPHONE ENCOUNTER
Patient called saying that he is returning a phone called that he had got from the office in regard an opening of an appointment, patient stated that he will take the appointment and is asking if the office can give him a call

## 2022-12-14 NOTE — PATIENT INSTRUCTIONS
Wellness Visit for Adults   AMBULATORY CARE:   A wellness visit  is when you see your healthcare provider to get screened for health problems  Your healthcare provider will also give you advice on how to stay healthy  Write down your questions so you remember to ask them  Ask your healthcare provider how often you should have a wellness visit  What happens at a wellness visit:  Your healthcare provider will ask about your health, and your family history of health problems  This includes high blood pressure, heart disease, and cancer  He or she will ask if you have symptoms that concern you, if you smoke, and about your mood  You may also be asked about your intake of medicines, supplements, food, and alcohol  Any of the following may be done:  · Your weight  will be checked  Your height may also be checked so your body mass index (BMI) can be calculated  Your BMI shows if you are at a healthy weight  · Your blood pressure  and heart rate will be checked  Your temperature may also be checked  · Blood and urine tests  may be done  Blood tests may be done to check your cholesterol levels  Abnormal cholesterol levels increase your risk for heart disease and stroke  You may also need a blood or urine test to check for diabetes if you are at increased risk  Urine tests may be done to look for signs of an infection or kidney disease  · A physical exam  includes checking your heartbeat and lungs with a stethoscope  Your healthcare provider may also check your skin to look for sun damage  · Screening tests  may be recommended  A screening test is done to check for diseases that may not cause symptoms  The screening tests you may need depend on your age, gender, family history, and lifestyle habits  For example, colorectal screening may be recommended if you are 48years old or older  Screening tests you need if you are a woman:   · A Pap smear  is used to screen for cervical cancer   Pap smears are usually done every 3 to 5 years depending on your age  You may need them more often if you have had abnormal Pap smear test results in the past  Ask your healthcare provider how often you should have a Pap smear  · A mammogram  is an x-ray of your breasts to screen for breast cancer  Experts recommend mammograms every 2 years starting at age 48 years  You may need a mammogram at age 52 years or younger if you have an increased risk for breast cancer  Talk to your healthcare provider about when you should start having mammograms and how often you need them  Vaccines you may need:   · Get an influenza vaccine  every year  The influenza vaccine protects you from the flu  Several types of viruses cause the flu  The viruses change over time, so new vaccines are made each year  · Get a tetanus-diphtheria (Td) booster vaccine  every 10 years  This vaccine protects you against tetanus and diphtheria  Tetanus is a severe infection that may cause painful muscle spasms and lockjaw  Diphtheria is a severe bacterial infection that causes a thick covering in the back of your mouth and throat  · Get a human papillomavirus (HPV) vaccine  if you are female and aged 23 to 32 or male 23 to 24 and never received it  This vaccine protects you from HPV infection  HPV is the most common infection spread by sexual contact  HPV may also cause vaginal, penile, and anal cancers  · Get a pneumococcal vaccine  if you are aged 72 years or older  The pneumococcal vaccine is an injection given to protect you from pneumococcal disease  Pneumococcal disease is an infection caused by pneumococcal bacteria  The infection may cause pneumonia, meningitis, or an ear infection  · Get a shingles vaccine  if you are 60 or older, even if you have had shingles before  The shingles vaccine is an injection to protect you from the varicella-zoster virus  This is the same virus that causes chickenpox   Shingles is a painful rash that develops in people who had chickenpox or have been exposed to the virus  How to eat healthy:  My Plate is a model for planning healthy meals  It shows the types and amounts of foods that should go on your plate  Fruits and vegetables make up about half of your plate, and grains and protein make up the other half  A serving of dairy is included on the side of your plate  The amount of calories and serving sizes you need depends on your age, gender, weight, and height  Examples of healthy foods are listed below:  · Eat a variety of vegetables  such as dark green, red, and orange vegetables  You can also include canned vegetables low in sodium (salt) and frozen vegetables without added butter or sauces  · Eat a variety of fresh fruits , canned fruit in 100% juice, frozen fruit, and dried fruit  · Include whole grains  At least half of the grains you eat should be whole grains  Examples include whole-wheat bread, wheat pasta, brown rice, and whole-grain cereals such as oatmeal     · Eat a variety of protein foods such as seafood (fish and shellfish), lean meat, and poultry without skin (turkey and chicken)  Examples of lean meats include pork leg, shoulder, or tenderloin, and beef round, sirloin, tenderloin, and extra lean ground beef  Other protein foods include eggs and egg substitutes, beans, peas, soy products, nuts, and seeds  · Choose low-fat dairy products such as skim or 1% milk or low-fat yogurt, cheese, and cottage cheese  · Limit unhealthy fats  such as butter, hard margarine, and shortening  Exercise:  Exercise at least 30 minutes per day on most days of the week  Some examples of exercise include walking, biking, dancing, and swimming  You can also fit in more physical activity by taking the stairs instead of the elevator or parking farther away from stores  Include muscle strengthening activities 2 days each week  Regular exercise provides many health benefits   It helps you manage your weight, and decreases your risk for type 2 diabetes, heart disease, stroke, and high blood pressure  Exercise can also help improve your mood  Ask your healthcare provider about the best exercise plan for you  General health and safety guidelines:   · Do not smoke  Nicotine and other chemicals in cigarettes and cigars can cause lung damage  Ask your healthcare provider for information if you currently smoke and need help to quit  E-cigarettes or smokeless tobacco still contain nicotine  Talk to your healthcare provider before you use these products  · Limit alcohol  A drink of alcohol is 12 ounces of beer, 5 ounces of wine, or 1½ ounces of liquor  · Lose weight, if needed  Being overweight increases your risk of certain health conditions  These include heart disease, high blood pressure, type 2 diabetes, and certain types of cancer  · Protect your skin  Do not sunbathe or use tanning beds  Use sunscreen with a SPF 15 or higher  Apply sunscreen at least 15 minutes before you go outside  Reapply sunscreen every 2 hours  Wear protective clothing, hats, and sunglasses when you are outside  · Drive safely  Always wear your seatbelt  Make sure everyone in your car wears a seatbelt  A seatbelt can save your life if you are in an accident  Do not use your cell phone when you are driving  This could distract you and cause an accident  Pull over if you need to make a call or send a text message  · Practice safe sex  Use latex condoms if are sexually active and have more than one partner  Your healthcare provider may recommend screening tests for sexually transmitted infections (STIs)  · Wear helmets, lifejackets, and protective gear  Always wear a helmet when you ride a bike or motorcycle, go skiing, or play sports that could cause a head injury  Wear protective equipment when you play sports  Wear a lifejacket when you are on a boat or doing water sports      © Copyright [address] 2020 Information is for End User's use only and may not be sold, redistributed or otherwise used for commercial purposes  All illustrations and images included in CareNotes® are the copyrighted property of A D A M , Inc  or Ayaan Del Real  The above information is an  only  It is not intended as medical advice for individual conditions or treatments  Talk to your doctor, nurse or pharmacist before following any medical regimen to see if it is safe and effective for you  Wellness Visit for Adults   AMBULATORY CARE:   A wellness visit  is when you see your healthcare provider to get screened for health problems  Your healthcare provider will also give you advice on how to stay healthy  Write down your questions so you remember to ask them  Ask your healthcare provider how often you should have a wellness visit  What happens at a wellness visit:  Your healthcare provider will ask about your health, and your family history of health problems  This includes high blood pressure, heart disease, and cancer  He or she will ask if you have symptoms that concern you, if you smoke, and about your mood  You may also be asked about your intake of medicines, supplements, food, and alcohol  Any of the following may be done:  · Your weight  will be checked  Your height may also be checked so your body mass index (BMI) can be calculated  Your BMI shows if you are at a healthy weight  · Your blood pressure  and heart rate will be checked  Your temperature may also be checked  · Blood and urine tests  may be done  Blood tests may be done to check your cholesterol levels  Abnormal cholesterol levels increase your risk for heart disease and stroke  You may also need a blood or urine test to check for diabetes if you are at increased risk  Urine tests may be done to look for signs of an infection or kidney disease  · A physical exam  includes checking your heartbeat and lungs with a stethoscope   Your healthcare provider may also check your skin to look for sun damage  · Screening tests  may be recommended  A screening test is done to check for diseases that may not cause symptoms  The screening tests you may need depend on your age, gender, family history, and lifestyle habits  For example, colorectal screening may be recommended if you are 48years old or older  Screening tests you need if you are a woman:   · A Pap smear  is used to screen for cervical cancer  Pap smears are usually done every 3 to 5 years depending on your age  You may need them more often if you have had abnormal Pap smear test results in the past  Ask your healthcare provider how often you should have a Pap smear  · A mammogram  is an x-ray of your breasts to screen for breast cancer  Experts recommend mammograms every 2 years starting at age 48 years  You may need a mammogram at age 52 years or younger if you have an increased risk for breast cancer  Talk to your healthcare provider about when you should start having mammograms and how often you need them  Vaccines you may need:   · Get an influenza vaccine  every year  The influenza vaccine protects you from the flu  Several types of viruses cause the flu  The viruses change over time, so new vaccines are made each year  · Get a tetanus-diphtheria (Td) booster vaccine  every 10 years  This vaccine protects you against tetanus and diphtheria  Tetanus is a severe infection that may cause painful muscle spasms and lockjaw  Diphtheria is a severe bacterial infection that causes a thick covering in the back of your mouth and throat  · Get a human papillomavirus (HPV) vaccine  if you are female and aged 23 to 32 or male 23 to 24 and never received it  This vaccine protects you from HPV infection  HPV is the most common infection spread by sexual contact  HPV may also cause vaginal, penile, and anal cancers  · Get a pneumococcal vaccine  if you are aged 72 years or older   The pneumococcal vaccine is an injection given to protect you from pneumococcal disease  Pneumococcal disease is an infection caused by pneumococcal bacteria  The infection may cause pneumonia, meningitis, or an ear infection  · Get a shingles vaccine  if you are 60 or older, even if you have had shingles before  The shingles vaccine is an injection to protect you from the varicella-zoster virus  This is the same virus that causes chickenpox  Shingles is a painful rash that develops in people who had chickenpox or have been exposed to the virus  How to eat healthy:  My Plate is a model for planning healthy meals  It shows the types and amounts of foods that should go on your plate  Fruits and vegetables make up about half of your plate, and grains and protein make up the other half  A serving of dairy is included on the side of your plate  The amount of calories and serving sizes you need depends on your age, gender, weight, and height  Examples of healthy foods are listed below:  · Eat a variety of vegetables  such as dark green, red, and orange vegetables  You can also include canned vegetables low in sodium (salt) and frozen vegetables without added butter or sauces  · Eat a variety of fresh fruits , canned fruit in 100% juice, frozen fruit, and dried fruit  · Include whole grains  At least half of the grains you eat should be whole grains  Examples include whole-wheat bread, wheat pasta, brown rice, and whole-grain cereals such as oatmeal     · Eat a variety of protein foods such as seafood (fish and shellfish), lean meat, and poultry without skin (turkey and chicken)  Examples of lean meats include pork leg, shoulder, or tenderloin, and beef round, sirloin, tenderloin, and extra lean ground beef  Other protein foods include eggs and egg substitutes, beans, peas, soy products, nuts, and seeds  · Choose low-fat dairy products such as skim or 1% milk or low-fat yogurt, cheese, and cottage cheese      · Limit unhealthy fats Quality 226: Preventive Care And Screening: Tobacco Use: Screening And Cessation Intervention: Patient screened for tobacco use and is an ex/non-smoker such as butter, hard margarine, and shortening  Exercise:  Exercise at least 30 minutes per day on most days of the week  Some examples of exercise include walking, biking, dancing, and swimming  You can also fit in more physical activity by taking the stairs instead of the elevator or parking farther away from stores  Include muscle strengthening activities 2 days each week  Regular exercise provides many health benefits  It helps you manage your weight, and decreases your risk for type 2 diabetes, heart disease, stroke, and high blood pressure  Exercise can also help improve your mood  Ask your healthcare provider about the best exercise plan for you  General health and safety guidelines:   · Do not smoke  Nicotine and other chemicals in cigarettes and cigars can cause lung damage  Ask your healthcare provider for information if you currently smoke and need help to quit  E-cigarettes or smokeless tobacco still contain nicotine  Talk to your healthcare provider before you use these products  · Limit alcohol  A drink of alcohol is 12 ounces of beer, 5 ounces of wine, or 1½ ounces of liquor  · Lose weight, if needed  Being overweight increases your risk of certain health conditions  These include heart disease, high blood pressure, type 2 diabetes, and certain types of cancer  · Protect your skin  Do not sunbathe or use tanning beds  Use sunscreen with a SPF 15 or higher  Apply sunscreen at least 15 minutes before you go outside  Reapply sunscreen every 2 hours  Wear protective clothing, hats, and sunglasses when you are outside  · Drive safely  Always wear your seatbelt  Make sure everyone in your car wears a seatbelt  A seatbelt can save your life if you are in an accident  Do not use your cell phone when you are driving  This could distract you and cause an accident  Pull over if you need to make a call or send a text message  · Practice safe sex    Use latex condoms if are sexually Detail Level: Detailed active and have more than one partner  Your healthcare provider may recommend screening tests for sexually transmitted infections (STIs)  · Wear helmets, lifejackets, and protective gear  Always wear a helmet when you ride a bike or motorcycle, go skiing, or play sports that could cause a head injury  Wear protective equipment when you play sports  Wear a lifejacket when you are on a boat or doing water sports  © Copyright [address] Information is for End User's use only and may not be sold, redistributed or otherwise used for commercial purposes  All illustrations and images included in CareNotes® are the copyrighted property of A D A M , Inc  or [employer]   The above information is an  only  It is not intended as medical advice for individual conditions or treatments  Talk to your doctor, nurse or pharmacist before following any medical regimen to see if it is safe and effective for you  Cigarette Smoking and Your Health   AMBULATORY CARE:   Risks to your health if you smoke:  Nicotine and other chemicals found in tobacco and e-cigarettes can damage every cell in your body  Even if you are a light smoker, you have an increased risk for cancer, heart disease, and lung disease  If you are pregnant or have diabetes, smoking increases your risk for complications  Nicotine can affect an adolescent's developing brain  This can lead to trouble thinking, learning, or paying attention  Benefits to your health if you stop smoking:   · You decrease respiratory symptoms such as coughing, wheezing, and shortness of breath  · You reduce your risk for cancers of the lung, mouth, throat, kidney, bladder, pancreas, stomach, and cervix  If you already have cancer, you increase the benefits of chemotherapy  You also reduce your risk for cancer returning or a second cancer from developing  · You reduce your risk for heart disease, blood clots, heart attack, and stroke      · You reduce Quality 431: Preventive Care And Screening: Unhealthy Alcohol Use - Screening: Patient not identified as an unhealthy alcohol user when screened for unhealthy alcohol use using a systematic screening method your risk for lung infections, and diseases such as pneumonia, asthma, chronic bronchitis, and emphysema  · Your circulation improves  More oxygen can be delivered to your body  If you have diabetes, you lower your risk for complications, such as kidney, artery, and eye diseases  You also lower your risk for nerve damage  Nerve damage can lead to amputations, poor vision, and blindness  · You improve your body's ability to heal and to fight infections  · An adolescent can help his or her brain and body develop in a healthy way  Talk to your adolescent about all the health risks of nicotine  If you can, start talking about nicotine when your child is younger than 12 years  This may make it easier for him or her not to start using nicotine as a teenager or adult  Explain to him or her that it is best never to start  It can be hard to try to quit later  Benefits to the health of others if you stop smoking:  Tobacco is harmful to nonsmokers who breathe in your secondhand smoke  The following are ways the health of others around you may improve when you stop smoking:  · You lower the risks for lung cancer and heart disease in nonsmoking adults  · If you are pregnant, you lower the risk for miscarriage, early delivery, low birth weight, and stillbirth  You also lower your baby's risk for SIDS, obesity, developmental delay, and neurobehavioral problems, such as ADHD  · If you have children, you lower their risk for ear infections, colds, pneumonia, bronchitis, and asthma  Follow up with your doctor as directed:  Write down your questions so you remember to ask them during your visits  For more information and support to stop smoking:   · Smokefree  gov  Phone: 7- 296 - 926-6232  Web Address: [address]hew [address] Information is for End User's use only and may not be sold, redistributed or otherwise used for commercial purposes   All illustrations and images included in CareNotes® are the copyrighted property of [employer]  or Ayaan Sandoval   The above information is an  only  It is not intended as medical advice for individual conditions or treatments  Talk to your doctor, nurse or pharmacist before following any medical regimen to see if it is safe and effective for you  Quality 110: Preventive Care And Screening: Influenza Immunization: Influenza immunization was not ordered or administered, reason not given

## 2023-01-12 ENCOUNTER — HOSPITAL ENCOUNTER (OUTPATIENT)
Dept: SLEEP CENTER | Facility: CLINIC | Age: 48
Discharge: HOME | End: 2023-01-12

## 2023-01-12 DIAGNOSIS — G47.33 OSA (OBSTRUCTIVE SLEEP APNEA): ICD-10-CM

## 2023-01-13 DIAGNOSIS — G47.33 OSA (OBSTRUCTIVE SLEEP APNEA): Primary | ICD-10-CM

## 2023-01-13 NOTE — PROGRESS NOTES
Sleep Study Documentation    Pre-Sleep Study       Sleep testing procedure explained to patient:YES    Patient napped prior to study:NO    Caffeine:Dayshift worker after 12PM   Caffeine use:YES- coffee  18 ounces or more    Alcohol:Dayshift workers after 5PM: Alcohol use:NO    Typical day for patient:YES       Study Documentation    Sleep Study Indications:     Sleep Study: Treatment   Optimal PAP pressure: 6cm  Leak:None  Snore:Eliminated  REM Obtained:yes  Supplemental O2: no    Minimum SaO2 90%  Baseline SaO2 95 7%  PAP mask tried (list all)Resmed F20, Resmed N30  PAP mask choice (final)Resmed F20  PAP mask type:full face  PAP pressure at which snoring was eliminated 5cm  Minimum SaO2 at final PAP pressure 91%  Mode of Therapy:CPAP  ETCO2:No  CPAP changed to BiPAP:No    Mode of Therapy:CPAP    EKG abnormalities: no     EEG abnormalities: no    Sleep Study Recorded < 2 hours: N/A    Sleep Study Recorded > 2 hours but incomplete study: N/A    Sleep Study Recorded 6 hours but no sleep obtained: NO    Patient classification: disabled       Post-Sleep Study    Medication used at bedtime or during sleep study:NO    Patient reports time it took to fall asleep:greater than 60 minutes    Patient reports waking up during study:1 to 2 times  Patient reports returning to sleep without difficulty  Patient reports sleeping 4 to 6 hours without dreaming  Patient reports sleep during study:worse than usual    Patient rated sleepiness: Somewhat sleepy or tired    PAP treatment:yes: Post PAP treatment patient reports feeling unsure if a change is noted and  would wear PAP mask at home

## 2023-01-16 ENCOUNTER — TELEPHONE (OUTPATIENT)
Dept: SLEEP CENTER | Facility: CLINIC | Age: 48
End: 2023-01-16

## 2023-01-16 NOTE — TELEPHONE ENCOUNTER
Patient of Dr Braulio Luis with mild SANDEEP and hypoxia  CPAP study completed and Dr Braulio Luis ordered APAP     Patient needs DME set up and compliance appointments scheduled      Left call back message

## 2023-01-17 ENCOUNTER — TELEPHONE (OUTPATIENT)
Dept: SLEEP CENTER | Facility: CLINIC | Age: 48
End: 2023-01-17

## 2023-01-17 NOTE — TELEPHONE ENCOUNTER
Patient called the office looking for results of sleep study  Call transferred to the nursing line  Patient voiced frustration at [employer], its staff and the telephone customer service provided, then requested all sleep study results  Advised patient that diagnostic sleep study shows mild SANDEEP with hypoxia  Advised that based on the results of the CPAP titration study, Dr Radha Diaz has placed an order for APAP  Patient had several questions about insurance coverage for CPAP, advised to contact his insurance provider for coverage  Patient then inquired about DME providers and if insurance will work with them or if he would need to pay out of pocket, advised to contact his insurance provider for a list of DME providers that his insurance works with  Patient asked if it was necessary to treat SANDEEP since his case is mild, advised patient that it is advised to treat SANDEEP no matter the severity  Patient asked if he would need to use a CPAP every night if he did receive one, advised patient insurance providers require use 4 hours per night or at least 70% of the time  Patient asked if there were alternatives to CPAP, offered to schedule a follow-up appointment with Dr Radha Diaz if he would like to discuss potential alternative options, patient declined and agreed [facility]  DME setup scheduled 2/16/2023 in the [facility] office  Compliance follow-up scheduled 7/20/2023 with Dr Radha Diaz in the [facility] office  Patient added to the cancellation list for a sooner appointment date within the 31-90 day compliance window (3/20/2023-5/17/2023)  [facility] CPAP and clinicals [facility] [employer]

## 2023-01-19 LAB

## 2023-02-16 LAB

## 2023-03-14 ENCOUNTER — TELEPHONE (OUTPATIENT)
Dept: NEUROLOGY | Facility: CLINIC | Age: 48
End: 2023-03-14

## 2023-03-28 ENCOUNTER — OFFICE VISIT (OUTPATIENT)
Dept: NEUROLOGY | Facility: CLINIC | Age: 48
End: 2023-03-28

## 2023-03-28 VITALS
DIASTOLIC BLOOD PRESSURE: 82 MMHG | SYSTOLIC BLOOD PRESSURE: 113 MMHG | HEIGHT: 74 IN | WEIGHT: 246 LBS | OXYGEN SATURATION: 96 % | TEMPERATURE: 97.3 F | BODY MASS INDEX: 31.57 KG/M2 | HEART RATE: 91 BPM

## 2023-03-28 DIAGNOSIS — F17.200 SMOKING: ICD-10-CM

## 2023-03-28 DIAGNOSIS — E55.9 VITAMIN D DEFICIENCY: ICD-10-CM

## 2023-03-28 DIAGNOSIS — G35 MULTIPLE SCLEROSIS (HCC): Primary | ICD-10-CM

## 2023-03-28 DIAGNOSIS — E53.8 DEFICIENCY OF OTHER SPECIFIED B GROUP VITAMINS: ICD-10-CM

## 2023-03-28 RX ORDER — POLYETHYLENE GLYCOL 3350 17 G
4 POWDER IN PACKET (EA) ORAL AS NEEDED
Qty: 100 EACH | Refills: 1 | Status: SHIPPED | OUTPATIENT
Start: 2023-03-28

## 2023-03-28 NOTE — PROGRESS NOTES
Return NeuroOutpatient Note        Omaira Kasper  [id number]  42 y o   1975        Schizo affective schizophrenia, Dysphagia, Sleep disturbance, and Multiple Sclerosis        History obtained from:  Patient     HPI/Subjective:    Omaira Kasper is a 50 yo M with PMH of MS presents as f/u  Per my previous history, he has known about MS since at least  2010  Patient was dx with MS in [place] about 15 years ago  It had started out as right facial numbness  Per my previous history, patient  was on Rebiff for sometime but it was making him sick so stopped it  Patient was not seeing neurologist regularly  Patient doesn't have much physical limitations  He can't climb ladder  He walks independently and drives  He will occasionally get numbness in feet but his sxs are tolerable   His recent mri brain from 6/28/21  revealed new white matter lesion in the right frontal centrum semiovale with associated enhancement, indicative of an actively demyelinating focus in this patient with history of multiple sclerosis  When we finally got a hold of him, he agreed to start oral DMD  We had started him on tecfidera  He was getting bumpy spotches on his body  He would feel like his hair was growing type of sensation which would last 2-3 hrs after dose  Then we had started him on copaxone generic but from this he gets red blotches, extreme fatigue, flu like symptoms  At injection site, he would get 'knots' and they would last a long time  We had ordered for aubagio prior to this but his insurance hadn't covered  He had tried Rebiff 10 years ago  He had achy, flu like symptoms       We were finally able to approve him of Aubagio and he's on 14mg daily  He tolerated it well for first few months but 2 months ago he started developing red blotches, rash from left ear down to neck, blurry vision and he was asked to stop it  He was asked to report to [employer] as well     He hasn't been taking DMD since then      Physically he feels fine  He can't recall any particular symptoms       His Vit D level was low at 21  He's prescribed but hasn't been taking them       He reports problem maintaining sleep  He may get 5-6 hrs a night  He is not sure about snoring        He had reported dysphagia last fall  His barium swallow test was wnl       He is dx with schizoaffective, bipolar disorder  he says that his psychiatrist doesn't discuss his condition with him       He was on vraylar that was helping with paranoia but he was dull and couldn't function much            Past Medical History:   Diagnosis Date   • Multiple sclerosis ([facility] [address] )    • Psychiatric disorder      Social History     Socioeconomic History   • Marital status: Single     Spouse name: Not on file   • Number of children: Not on file   • Years of education: Not on file   • Highest education level: Not on file   Occupational History   • Not on file   Tobacco Use   • Smoking status: Every Day     Packs/day: 0 50     Types: Cigarettes   • Smokeless tobacco: Current   Vaping Use   • Vaping Use: Never used   Substance and Sexual Activity   • Alcohol use: No   • Drug use: Not Currently     Comment: sts he quit drugs   • Sexual activity: Not on file   Other Topics Concern   • Not on file   Social History Narrative    ** Merged History Encounter **          Social Determinants of Health     Financial Resource Strain: Low Risk    • Difficulty of Paying Living Expenses: Not very hard   Food Insecurity: Not on file   Transportation Needs: No Transportation Needs   • Lack of Transportation (Medical): No   • Lack of Transportation (Non-Medical):  No   Physical Activity: Not on file   Stress: Not on file   Social Connections: Not on file   Intimate Partner Violence: Not on file   Housing Stability: Not on file     Family History   Problem Relation Age of Onset   • Diabetes Mother    • Crohn's disease Father      No Known Allergies  Current Outpatient Medications on File Prior to Visit   Medication Sig Dispense Refill   • ergocalciferol (VITAMIN D2) 50,000 units Take 1 capsule (50,000 Units total) by mouth once a week 8 capsule 2   • Vraylar 3 MG capsule Take 6 mg by mouth daily     • [DISCONTINUED] nicotine polacrilex (COMMIT) 4 MG lozenge APPLY 1 LOZENGE (4 MG TOTAL) TO THE MOUTH OR THROAT AS NEEDED FOR SMOKING CESSATION 100 each 0   • ARIPiprazole (ABILIFY) 10 mg tablet Take 10 mg by mouth daily (Patient not taking: Reported on 4/11/2022)     • Aubagio 14 MG TABS TAKE 1 TABLET BY MOUTH 1 TIME A DAY  (Patient not taking: Reported on 3/28/2023) 30 tablet 2   • cariprazine (VRAYLAR) 1 5 MG capsule Take 1 5 mg by mouth daily (Patient not taking: Reported on 4/11/2022)     • HM Nicotine Polacrilex 4 MG gum CHEW 1 EACH (4 MG TOTAL) AS NEEDED FOR SMOKING CESSATION (Patient not taking: Reported on 9/28/2022) 100 each 1   • ibuprofen (MOTRIN) 800 mg tablet TAKE ONE TABLET BY MOUTH EVERY 8 HOURS WITH TYLENOL (Patient not taking: Reported on 9/28/2022)     • Lidocaine Viscous HCl (XYLOCAINE) 2 % mucosal solution Swish and spit 15 mL 4 (four) times a day as needed for mouth pain or discomfort (Patient not taking: Reported on 9/28/2022) 100 mL 0   • oxyCODONE (Roxicodone) 5 immediate release tablet Take 1 tablet (5 mg total) by mouth every 4 (four) hours as needed for moderate pain for up to 5 doses Max Daily Amount: 30 mg (Patient not taking: Reported on 8/23/2022) 5 tablet 0   • QuickVue At-Home Covid-19 Test KIT Use as directed (Patient not taking: Reported on 9/28/2022)     • Teriflunomide (Aubagio) 7 MG TABS Take 1 tablet (7 mg total) by mouth in the morning For 2 weeks  (Patient not taking: Reported on 9/28/2022) 14 tablet 0   • zolpidem (AMBIEN) 5 mg tablet Take 1 tablet (5 mg total) by mouth as needed for sleep (for use during sleep study) for up to 1 day (Patient not taking: Reported on 3/28/2023) 1 tablet 0     No current facility-administered medications on file prior to visit  "        Review of Systems   Refer to positive review of systems in HPI  Review of Systems    Constitutional- No fever  Eyes- No visual change  ENT- Hearing normal  CV- No chest pain  Resp- No Shortness of breath  GI- No diarrhea  - Bladder normal  MS- No Arthritis   Skin- No rash  Psych- No depression  Endo- No DM  Heme- No nodes    Vitals:    03/28/23 1440   BP: 113/82   BP Location: Left arm   Patient Position: Sitting   Cuff Size: Standard   Pulse: 91   Temp: (!) 97 3 °F (36 3 °C)   TempSrc: Tympanic   SpO2: 96%   Weight: 112 kg (246 lb)   Height: 6' 2\" (1 88 m)       PHYSICAL EXAM:  Appearance: No Acute Distress  Ophthalmoscopic: Disc Flat, Normal fundus  Mental status:  Orientation: Awake, Alert, and Orientedx3  Memory: Registation 3/3 Recall 3/3  Attention: normal  Knowledge: good  Language: No aphasia  Speech: No dysarthria  Cranial Nerves:  2 No Visual Defect on Confrontation, Pupils round, equal, reactive to light  3,4,6 Extraocular Movements Intact, no nystagmus  5 Facial Sensation Intact  7 No facial asymmetry  8 Intact hearing  9,10 Palate symmetric, normal gag  11 Good shoulder shrug  12 Tongue Midline  Gait: Stable  Coordination: No ataxia with finger to nose testing, and heel to shin  Sensory: Intact, Symmetric to pinprick, light touch, vibration, and joint position  Muscle Tone: Normal              Muscle exam:  Arm Right Left Leg Right Left   Deltoid 5/5 5/5 Iliopsoas 5/5 5/5   Biceps 5/5 5/5 Quads 5/5 5/5   Triceps 5/5 5/5 Hamstrings 5/5 5/5   Wrist Extension 5/5 5/5 Ankle Dorsi Flexion 5/5 5/5   Wrist Flexion 5/5 5/5 Ankle Plantar Flexion 5/5 5/5   Interossei 5/5 5/5 Ankle Eversion 5/5 5/5   APB 5/5 5/5 Ankle Inversion 5/5 5/5       Reflexes   RJ BJ TJ KJ AJ Plantars Subramanian's   Right 2+ 2+ 2+ 1+ 0 Downgoing Not present   Left 2+ 2+ 2+ 1+ 0 Downgoing Not present     Personal review of  Labs:                 Diagnoses and all orders for this visit:      1   [facility] sclerosis ([facility] )  MRI brain MS " wo and w contrast    Vitamin B12      2  Smoking  nicotine polacrilex (COMMIT) 4 MG lozenge      3  Vitamin D deficiency  Vitamin D 25 hydroxy      4  Deficiency of other specified B group vitamins  Vitamin B12          Physically, he's remained stable  He still smokes 1 pack/3 days  He tries but is not able to quit  Will recheck levels of vit D and B12  Will obtain f/u MRI brain to look at progression of his MS  Will try for Ponvory as he's failed 3 other DMDs  Encouraged physical activity  He has minor neuropathic pain in feet but he would rather not try any medications for it                 Total time of encounter:  30 min  More than 50% of the time was used in counseling and/or coordination of care  Extent of counseling and/or coordination of care        Dayan Macias MD  [facility]  [address]  [address]  [phone]

## 2023-03-30 LAB
25(OH)D3+25(OH)D2 SERPL-MCNC: 34 NG/ML (ref 30–100)
VIT B12 SERPL-MCNC: 558 PG/ML (ref 232–1245)

## 2023-05-04 ENCOUNTER — TELEMEDICINE (OUTPATIENT)
Dept: FAMILY MEDICINE CLINIC | Facility: CLINIC | Age: 48
End: 2023-05-04

## 2023-05-04 DIAGNOSIS — U07.1 COVID-19: Primary | ICD-10-CM

## 2023-05-06 PROBLEM — F41.9 ANXIETY: Status: ACTIVE | Noted: 2023-05-06

## 2023-05-06 PROBLEM — F19.921 DRUG INTOXICATION WITH DELIRIUM (HCC): Status: ACTIVE | Noted: 2018-11-21

## 2023-05-06 PROBLEM — F19.11 HISTORY OF SUBSTANCE ABUSE (HCC): Status: ACTIVE | Noted: 2023-05-06

## 2023-05-06 PROBLEM — R46.89 AGGRESSIVE BEHAVIOR OF ADULT: Status: ACTIVE | Noted: 2018-11-21

## 2023-05-18 ENCOUNTER — OFFICE VISIT (OUTPATIENT)
Dept: SLEEP CENTER | Facility: CLINIC | Age: 48
End: 2023-05-18

## 2023-05-18 VITALS
HEART RATE: 111 BPM | OXYGEN SATURATION: 96 % | BODY MASS INDEX: 30.29 KG/M2 | DIASTOLIC BLOOD PRESSURE: 78 MMHG | HEIGHT: 74 IN | WEIGHT: 236 LBS | SYSTOLIC BLOOD PRESSURE: 122 MMHG

## 2023-05-18 DIAGNOSIS — G47.19 EXCESSIVE DAYTIME SLEEPINESS: ICD-10-CM

## 2023-05-18 DIAGNOSIS — F20.0 PARANOID SCHIZOPHRENIA (HCC): ICD-10-CM

## 2023-05-18 DIAGNOSIS — Z72.0 TOBACCO ABUSE: ICD-10-CM

## 2023-05-18 DIAGNOSIS — E66.9 OBESITY (BMI 30-39.9): ICD-10-CM

## 2023-05-18 DIAGNOSIS — G35 MULTIPLE SCLEROSIS (HCC): ICD-10-CM

## 2023-05-18 DIAGNOSIS — F32.1 MODERATE MAJOR DEPRESSION, SINGLE EPISODE (HCC): ICD-10-CM

## 2023-05-18 DIAGNOSIS — F15.10 AMPHETAMINE USE DISORDER, MILD (HCC): ICD-10-CM

## 2023-05-18 DIAGNOSIS — G47.33 OSA (OBSTRUCTIVE SLEEP APNEA): Primary | ICD-10-CM

## 2023-05-18 NOTE — PATIENT INSTRUCTIONS

## 2023-05-18 NOTE — PROGRESS NOTES
Follow-Up Note - Sleep Center   Chris Ibarra  51 y o  male  :1975  LWV:[id number]  DOS:2023    CC: I saw this patient for follow-up in clinic today for Sleep disordered breathing, Coexisting Sleep and Medical Problems  Interval changes: Treatment initiated using a ResMed machine  The patient had both diagnostic and therapeutic sleep studies and is here to review results and to adjust therapy  The diagnostic study confirmed an mild obstructive sleep apnea: Apnea/hypopnea index (AHI) of 5 9 events per hour of sleep  The AHI in the supine position was 7 2  The AHI during REM sleep was 24 0  Intermittent snoring of moderate to loud intensity was noted  The lowest oxygen saturation was 87 0%  The amount of sleep time less than or equal to 89% was 6 4 minutes  During the subsequent therapeutic study, sleep disordered breathing appears to be adequately remediated with positive airway pressure at 6 cm H2O  There were mild periodic limb movements of sleep  Also Sleep onset and sleep maintenance insomnia     PFSH, Problem List, Medications & Allergies were reviewed in EMR  He  has a past medical history of Multiple sclerosis ([facility] [address] ) and Psychiatric disorder  He has a current medication list which includes the following prescription(s): vraylar, ergocalciferol, and nicotine polacrilex  PHYSIOLOGICAL DATA REVIEW : Device was used 6 of 30 days  using PAP > 4 hours/night 7%  Estimated RAZ 2 9/hour with pressure of 9 3cm [email or web address] com percentile; patient has not been using non FDA approved devices to sanitize the machine  INTERPRETATION: Compliance is non- compliant; Pressure setting is:optimal; ;   SUBJECTIVE: With respect to use of PAP, Jimenez Caba  is experiencing significant adverse effects:dry mouth/throat, mask discomfort and pressure insufficient  He derives benefit  Is satisfied with sleep and daytime function     Sleep Routine: Jimenez Caba reports getting 9 hrs sleep; he has no "difficulty initiating or maintaining sleep   He arises spontaneously and is not always refreshed  Lauren Riley reports excessive daytime sleepiness, [feels like napping but avoids  However at times he naps or dozes off ] He rated [himself] at Total score: 7 /24 on the [place] Sleepiness Scale  Other issues: None reported  Habits:[ reports that he has been smoking cigarettes  He has been smoking an average of  5 packs per day  He uses smokeless tobacco ], [ reports no history of alcohol use ], [ reports that he does not currently use drugs  ], Caffeine use:limited; Exercise routine: regular  ROS: Significant for weight is stable on his scale at home  Has some nasal and respiratory symptoms due to tobacco use  Mood is stable on current medications  [employer] EXAM: /78   Pulse (!) 111   Ht 6' 2\" (1 88 m)   Wt 107 kg (236 lb)   SpO2 96%   BMI 30 30 kg/m²     Wt Readings from Last 3 Encounters:   05/18/23 107 kg (236 lb)   03/28/23 112 kg (246 lb)   10/20/22 111 kg (244 lb 3 2 oz)      Patient is well groomed; well appearing  CNS: Alert, orientated, clear & coherent speech  Psych: cooperative and in no distress  Mental state: Appears normal   H&N: EOMI; NC/AT: No facial pressure marks, no rashes  Skin/Extrem: col & hydration normal; no edema  Resp: Respiratory effort is normal  Physical findings otherwise essentially unchanged from previous  IMPRESSION: Problem List Items & Comorbidities Addressed this Visit    1  SANDEEP (obstructive sleep apnea)  PAP DME Pressure Change    PAP DME Resupply/Reorder      2  Excessive daytime sleepiness        3  Tobacco abuse        4  Obesity (BMI 30-39 9)        5  Multiple sclerosis ([address] )        6  Paranoid schizophrenia ([facility] [address] )        7  Moderate major depression, single episode ([facility] [address] )        8  Amphetamine use disorder, mild (HCC)            PLAN:  1  I reviewed results of prior studies and physiologic data with the patient     2  I discussed treatment options with " "risks and benefits  3  Treatment with  PAP is medically necessary and Viktor Avilez is agreable to continue use  4  Care of equipment, methods to improve comfort using PAP and importance of compliance with therapy were discussed  5  Pressure setting: change 10 cmH2O  He may also benefit from reducing or turning off the ramp  6  Rx provided to replace supplies and Care coordinated with DME provider  7  Discussed strategies for weight reduction  If he is successful with weight reduction, he may not need CPAP  8  I advised smoking cessation and he is working at it  9  Follow-up is advised in 3 months to monitor progress, compliance and to adjust therapy  Thank you for allowing me to participate in the care of this patient  Sincerely,     Authenticated electronically on 01/14/74   Board Certified Specialist     Portions of the record may have been created with voice recognition software  Occasional wrong word or \"sound a like\" substitutions may have occurred due to the inherent limitations of voice recognition software  There may also be notations and random deletions of words or characters from malfunctioning software  Read the chart carefully and recognize, using context, where substitutions/deletions have occurred      "

## 2023-05-19 ENCOUNTER — TELEPHONE (OUTPATIENT)
Dept: SLEEP CENTER | Facility: CLINIC | Age: 48
End: 2023-05-19

## 2023-05-19 LAB
DME PARACHUTE DELIVERY DATE REQUESTED: NORMAL
DME PARACHUTE ITEM DESCRIPTION: NORMAL
DME PARACHUTE ORDER STATUS: NORMAL
DME PARACHUTE SUPPLIER NAME: NORMAL
DME PARACHUTE SUPPLIER PHONE: NORMAL

## 2023-05-22 DIAGNOSIS — G35 MULTIPLE SCLEROSIS (HCC): Primary | ICD-10-CM

## 2023-05-22 LAB

## 2023-05-22 RX ORDER — PONESIMOD 2 MG-10 MG
2 KIT ORAL DAILY
Qty: 1 EACH | Refills: 0 | Status: SHIPPED | OUTPATIENT
Start: 2023-05-22

## 2023-05-22 RX ORDER — PONESIMOD 20 MG/1
20 TABLET, FILM COATED ORAL DAILY
Qty: 30 TABLET | Refills: 3 | Status: SHIPPED | OUTPATIENT
Start: 2023-05-22

## 2023-06-21 ENCOUNTER — HOSPITAL ENCOUNTER (OUTPATIENT)
Dept: RADIOLOGY | Facility: HOSPITAL | Age: 48
Discharge: HOME | End: 2023-06-21
Attending: PSYCHIATRY & NEUROLOGY
Payer: MEDICARE

## 2023-06-21 DIAGNOSIS — G35 MULTIPLE SCLEROSIS (HCC): ICD-10-CM

## 2023-06-21 PROCEDURE — A9585 GADOBUTROL INJECTION: HCPCS | Performed by: PSYCHIATRY & NEUROLOGY

## 2023-06-21 PROCEDURE — 70553 MRI BRAIN STEM W/O & W/DYE: CPT

## 2023-06-21 PROCEDURE — G1004 CDSM NDSC: HCPCS

## 2023-06-21 RX ADMIN — GADOBUTROL 10 ML: 604.72 INJECTION INTRAVENOUS at 16:58

## 2023-06-28 ENCOUNTER — TELEPHONE (OUTPATIENT)
Dept: NEUROLOGY | Facility: CLINIC | Age: 48
End: 2023-06-28

## 2023-06-28 NOTE — TELEPHONE ENCOUNTER
Spoke to the patient and informed him of his MRI results  The patient clarified understanding the results  ----- Message from Joelle Mark MD sent at 6/27/2023  5:25 PM EDT -----  Please call the patient regarding his abnormal result  His mri brain shows improvement with prior MS lesion  No new lesions and otherwise is stable

## 2023-09-21 ENCOUNTER — HOSPITAL ENCOUNTER (EMERGENCY)
Facility: HOSPITAL | Age: 48
Discharge: HOME | End: 2023-09-21
Attending: EMERGENCY MEDICINE
Payer: MEDICARE

## 2023-09-21 VITALS
TEMPERATURE: 98.6 F | HEART RATE: 94 BPM | OXYGEN SATURATION: 97 % | SYSTOLIC BLOOD PRESSURE: 114 MMHG | DIASTOLIC BLOOD PRESSURE: 76 MMHG | RESPIRATION RATE: 20 BRPM

## 2023-09-21 DIAGNOSIS — N17.9 AKI (ACUTE KIDNEY INJURY) (HCC): ICD-10-CM

## 2023-09-21 DIAGNOSIS — F29 PSYCHOSES (HCC): Primary | ICD-10-CM

## 2023-09-21 LAB
ALBUMIN SERPL BCP-MCNC: 4 G/DL (ref 3.5–5)
ALP SERPL-CCNC: 69 U/L (ref 34–104)
ALT SERPL W P-5'-P-CCNC: 25 U/L (ref 7–52)
AMPHETAMINES SERPL QL SCN: POSITIVE
ANION GAP SERPL CALCULATED.3IONS-SCNC: 12 MMOL/L
ANION GAP SERPL CALCULATED.3IONS-SCNC: 7 MMOL/L
AST SERPL W P-5'-P-CCNC: 32 U/L (ref 13–39)
BARBITURATES UR QL: NEGATIVE
BASOPHILS # BLD AUTO: 0.07 THOUSANDS/ÂΜL (ref 0–0.1)
BASOPHILS NFR BLD AUTO: 1 % (ref 0–1)
BENZODIAZ UR QL: POSITIVE
BILIRUB SERPL-MCNC: 0.86 MG/DL (ref 0.2–1)
BILIRUB UR QL STRIP: NEGATIVE
BUN SERPL-MCNC: 33 MG/DL (ref 5–25)
BUN SERPL-MCNC: 40 MG/DL (ref 5–25)
CALCIUM SERPL-MCNC: 7.3 MG/DL (ref 8.4–10.2)
CALCIUM SERPL-MCNC: 9.3 MG/DL (ref 8.4–10.2)
CHLORIDE SERPL-SCNC: 102 MMOL/L (ref 96–108)
CHLORIDE SERPL-SCNC: 110 MMOL/L (ref 96–108)
CLARITY UR: CLEAR
CO2 SERPL-SCNC: 20 MMOL/L (ref 21–32)
CO2 SERPL-SCNC: 20 MMOL/L (ref 21–32)
COCAINE UR QL: NEGATIVE
COLOR UR: YELLOW
CREAT SERPL-MCNC: 1.48 MG/DL (ref 0.6–1.3)
CREAT SERPL-MCNC: 2.22 MG/DL (ref 0.6–1.3)
EOSINOPHIL # BLD AUTO: 0.02 THOUSAND/ÂΜL (ref 0–0.61)
EOSINOPHIL NFR BLD AUTO: 0 % (ref 0–6)
ERYTHROCYTE [DISTWIDTH] IN BLOOD BY AUTOMATED COUNT: 12.8 % (ref 11.6–15.1)
ETHANOL SERPL-MCNC: <10 MG/DL
GFR SERPL CREATININE-BSD FRML MDRD: 33 ML/MIN/1.73SQ M
GFR SERPL CREATININE-BSD FRML MDRD: 54 ML/MIN/1.73SQ M
GLUCOSE SERPL-MCNC: 102 MG/DL (ref 65–140)
GLUCOSE SERPL-MCNC: 86 MG/DL (ref 65–140)
GLUCOSE UR STRIP-MCNC: NEGATIVE MG/DL
HCT VFR BLD AUTO: 43.8 % (ref 36.5–49.3)
HGB BLD-MCNC: 15.3 G/DL (ref 12–17)
HGB UR QL STRIP.AUTO: NEGATIVE
IMM GRANULOCYTES # BLD AUTO: 0.04 THOUSAND/UL (ref 0–0.2)
IMM GRANULOCYTES NFR BLD AUTO: 0 % (ref 0–2)
KETONES UR STRIP-MCNC: ABNORMAL MG/DL
LEUKOCYTE ESTERASE UR QL STRIP: NEGATIVE
LYMPHOCYTES # BLD AUTO: 1.58 THOUSANDS/ÂΜL (ref 0.6–4.47)
LYMPHOCYTES NFR BLD AUTO: 14 % (ref 14–44)
MCH RBC QN AUTO: 31.2 PG (ref 26.8–34.3)
MCHC RBC AUTO-ENTMCNC: 34.9 G/DL (ref 31.4–37.4)
MCV RBC AUTO: 89 FL (ref 82–98)
METHADONE UR QL: NEGATIVE
MONOCYTES # BLD AUTO: 0.83 THOUSAND/ÂΜL (ref 0.17–1.22)
MONOCYTES NFR BLD AUTO: 7 % (ref 4–12)
NEUTROPHILS # BLD AUTO: 8.64 THOUSANDS/ÂΜL (ref 1.85–7.62)
NEUTS SEG NFR BLD AUTO: 78 % (ref 43–75)
NITRITE UR QL STRIP: NEGATIVE
NRBC BLD AUTO-RTO: 0 /100 WBCS
OPIATES UR QL SCN: NEGATIVE
OXYCODONE+OXYMORPHONE UR QL SCN: NEGATIVE
PCP UR QL: NEGATIVE
PH UR STRIP.AUTO: 5.5 [PH]
PLATELET # BLD AUTO: 295 THOUSANDS/UL (ref 149–390)
PMV BLD AUTO: 10 FL (ref 8.9–12.7)
POTASSIUM SERPL-SCNC: 3.3 MMOL/L (ref 3.5–5.3)
POTASSIUM SERPL-SCNC: 3.6 MMOL/L (ref 3.5–5.3)
PROT SERPL-MCNC: 7.1 G/DL (ref 6.4–8.4)
PROT UR STRIP-MCNC: NEGATIVE MG/DL
RBC # BLD AUTO: 4.91 MILLION/UL (ref 3.88–5.62)
SODIUM SERPL-SCNC: 134 MMOL/L (ref 135–147)
SODIUM SERPL-SCNC: 137 MMOL/L (ref 135–147)
SP GR UR STRIP.AUTO: 1.02 (ref 1–1.03)
THC UR QL: NEGATIVE
UROBILINOGEN UR QL STRIP.AUTO: 0.2 E.U./DL
WBC # BLD AUTO: 11.18 THOUSAND/UL (ref 4.31–10.16)

## 2023-09-21 PROCEDURE — 85025 COMPLETE CBC W/AUTO DIFF WBC: CPT | Performed by: EMERGENCY MEDICINE

## 2023-09-21 PROCEDURE — 80048 BASIC METABOLIC PNL TOTAL CA: CPT | Performed by: EMERGENCY MEDICINE

## 2023-09-21 PROCEDURE — 96360 HYDRATION IV INFUSION INIT: CPT

## 2023-09-21 PROCEDURE — 82077 ASSAY SPEC XCP UR&BREATH IA: CPT | Performed by: EMERGENCY MEDICINE

## 2023-09-21 PROCEDURE — 99283 EMERGENCY DEPT VISIT LOW MDM: CPT

## 2023-09-21 PROCEDURE — 36415 COLL VENOUS BLD VENIPUNCTURE: CPT | Performed by: EMERGENCY MEDICINE

## 2023-09-21 PROCEDURE — 80053 COMPREHEN METABOLIC PANEL: CPT | Performed by: EMERGENCY MEDICINE

## 2023-09-21 PROCEDURE — NC001 PR NO CHARGE: Performed by: EMERGENCY MEDICINE

## 2023-09-21 PROCEDURE — 81003 URINALYSIS AUTO W/O SCOPE: CPT | Performed by: EMERGENCY MEDICINE

## 2023-09-21 PROCEDURE — 80307 DRUG TEST PRSMV CHEM ANLYZR: CPT | Performed by: EMERGENCY MEDICINE

## 2023-09-21 PROCEDURE — 99285 EMERGENCY DEPT VISIT HI MDM: CPT | Performed by: EMERGENCY MEDICINE

## 2023-09-21 PROCEDURE — 96372 THER/PROPH/DIAG INJ SC/IM: CPT

## 2023-09-21 RX ORDER — MIDAZOLAM HYDROCHLORIDE 2 MG/2ML
2 INJECTION, SOLUTION INTRAMUSCULAR; INTRAVENOUS ONCE
Status: DISCONTINUED | OUTPATIENT
Start: 2023-09-21 | End: 2023-09-21

## 2023-09-21 RX ORDER — MIDAZOLAM HYDROCHLORIDE 2 MG/2ML
2 INJECTION, SOLUTION INTRAMUSCULAR; INTRAVENOUS ONCE
Status: COMPLETED | OUTPATIENT
Start: 2023-09-21 | End: 2023-09-21

## 2023-09-21 RX ORDER — HALOPERIDOL 5 MG/ML
10 INJECTION INTRAMUSCULAR ONCE
Status: COMPLETED | OUTPATIENT
Start: 2023-09-21 | End: 2023-09-21

## 2023-09-21 RX ADMIN — HALOPERIDOL LACTATE 10 MG: 5 INJECTION, SOLUTION INTRAMUSCULAR at 05:27

## 2023-09-21 RX ADMIN — SODIUM CHLORIDE 1000 ML: 0.9 INJECTION, SOLUTION INTRAVENOUS at 08:50

## 2023-09-21 RX ADMIN — MIDAZOLAM 2 MG: 1 INJECTION INTRAMUSCULAR; INTRAVENOUS at 05:27

## 2023-09-21 NOTE — ED CARE HANDOFF
Emergency Department Sign Out Note        Sign out and transfer of care from previous provider. See Separate Emergency Department note. The patient, Ivory Fleming, was evaluated by the previous provider for psychiatric evaluation    Workup Completed:  Screening labs reviewed. Patient has previous history of renal insufficiency with an ROB    ED Course / Workup Pending (followup):  Pending crisis evaluation this morning                                     Procedures  MDM        Disposition  Final diagnoses:   Psychoses ([address])   ROB (acute kidney injury) ([address])     Time reflects when diagnosis was documented in both MDM as applicable and the Disposition within this note     Time User Action Codes Description Comment    9/21/2023  6:37 AM Khang Pica Add [F29] Psychoses ([address])     9/21/2023  6:38 AM Khang Pica Add [N17.9] ROB (acute kidney injury) [facility])       ED Disposition     ED Disposition   Transfer to [facility]    Condition   --    Date/Time   Thu Sep 21, 2023  6:39 AM    Comment   Ivory Fleming should be transferred out to appropriate psychiatric facility if deemed appropriate after crisis evaluation and has been medically cleared. Follow-up Information    None       Patient's Medications   Discharge Prescriptions    No medications on file     No discharge procedures on file.        ED Provider  Electronically Signed by     David De La Torre MD  09/21/23 4433

## 2023-09-21 NOTE — ED NOTES
Patient not cooperating, and "politely refusing our services" unwilling to let staff draw blood     Miguel Bright RN  09/21/23 0700

## 2023-09-21 NOTE — ED PROVIDER NOTES
History  Chief Complaint   Patient presents with   • Psychiatric Evaluation     Pt arrives with police from home, called police beliving [employer] was being robbed, pt agitated at time of triage, reports he did the right thing and is "getting fucked". Pt was found by police in the [facility], defending the store with his broom. Pt belongings and [place] brought by police as well. 21-year-old male past medical history significant for psychiatric disorder in the past to the ED today by police. Police states that they were called to [facility] with a call saying that the place was under attack and people are being held hostage. When police got there they state that everyone in the center close to shopping is normal and the patient was standing there with a broom prepared for possible attack. Upon his arrival here patient was completely uncooperative. He states that the only thing that was happening was that he was "getting fucked by the police". Per police reports, patient has had multiple calls to his home with similar kind of stories where he believes he is being under attack by home invasion. When I discussed with patient that he needs a psychiatric evaluation given the events of today and needs blood work he states that he does not need his blood taken and he does not want his blood taken and that he is refusing "politely". I discussed with him that this is not able. Patient became more agitated. He was taken down into the psychiatric hold area where police found him down and found a knife and patient continued to be agitated. Decision was then made to do chemical restraints with Haldol and Versed. Prior to Admission Medications   Prescriptions Last Dose Informant Patient Reported? Taking? Ponesimod (Ponvory) 20 MG TABS   No No   Sig: Take 20 mg by mouth in the morning Start after completion of starter pack.    Ponesimod Starter Pack (Ponvory Starter Pack) 0-0-9-5-6-7-8-9 & 10 MG TBPK   No No Sig: Take 2 mg by mouth in the morning Please follow instructions of starter pack. Day 15th is when you take 20mg a day. Vraylar 3 MG capsule   Yes No   Sig: Take 6 mg by mouth daily   ergocalciferol (VITAMIN D2) 50,000 units   No No   Sig: Take 1 capsule (50,000 Units total) by mouth once a week   Patient not taking: Reported on 5/18/2023   nicotine polacrilex (COMMIT) 4 MG lozenge   No No   Sig: Apply 1 lozenge (4 mg total) to the mouth or throat as needed for smoking cessation   Patient not taking: Reported on 5/18/2023      Facility-Administered Medications: None       Past Medical History:   Diagnosis Date   • Multiple sclerosis ([address])    • Psychiatric disorder        Past Surgical History:   Procedure Laterality Date   • HAND SURGERY         Family History   Problem Relation Age of Onset   • Diabetes Mother    • Crohn's disease Father      I have reviewed and agree with the history as documented. E-Cigarette/Vaping   • E-Cigarette Use Never User      E-Cigarette/Vaping Substances   • Nicotine No    • THC No    • CBD No    • Other No    • Unknown No      Social History     Tobacco Use   • Smoking status: Every Day     Packs/day: 0.50     Types: Cigarettes   • Smokeless tobacco: Current   Vaping Use   • Vaping Use: Never used   Substance Use Topics   • Alcohol use: No   • Drug use: Not Currently     Comment: sts he quit drugs       Review of Systems   Unable to perform ROS: Psychiatric disorder       Physical Exam  Physical Exam  Vitals and nursing note reviewed. Constitutional:       General: He is not in acute distress. Appearance: Normal appearance. He is not ill-appearing. HENT:      Head: Normocephalic and atraumatic. Right Ear: External ear normal.      Left Ear: External ear normal.      Nose: Nose normal.      Mouth/Throat:      Mouth: Mucous membranes are moist.      Pharynx: Oropharynx is clear. No oropharyngeal exudate.    Eyes:      Extraocular Movements: Extraocular movements intact. Conjunctiva/sclera: Conjunctivae normal.      Pupils: Pupils are equal, round, and reactive to light. Cardiovascular:      Rate and Rhythm: Regular rhythm. Tachycardia present. Pulses: Normal pulses. Heart sounds: Normal heart sounds. Pulmonary:      Effort: Pulmonary effort is normal. No respiratory distress. Breath sounds: Normal breath sounds. No wheezing. Abdominal:      General: Abdomen is flat. Bowel sounds are normal. There is no distension. Palpations: Abdomen is soft. Tenderness: There is no abdominal tenderness. Musculoskeletal:         General: No swelling. Normal range of motion. Cervical back: Normal range of motion. Skin:     General: Skin is warm and dry. Capillary Refill: Capillary refill takes less than 2 seconds. Neurological:      General: No focal deficit present. Mental Status: He is alert and oriented to person, place, and time. Mental status is at baseline. Cranial Nerves: No cranial nerve deficit. Sensory: No sensory deficit. Motor: No weakness. Gait: Gait normal.   Psychiatric:         Mood and Affect: Affect is labile and angry. Speech: Speech is rapid and pressured. Behavior: Behavior is uncooperative, agitated and aggressive. Thought Content: Thought content is delusional. Thought content does not include homicidal or suicidal ideation. Thought content does not include homicidal or suicidal plan.          Vital Signs  ED Triage Vitals [09/21/23 0536]   Temperature Pulse Respirations Blood Pressure SpO2   98.2 °F (36.8 °C) (!) 107 18 133/65 94 %      Temp Source Heart Rate Source Patient Position - Orthostatic VS BP Location FiO2 (%)   Tympanic Monitor Sitting Left arm --      Pain Score       --           Vitals:    09/21/23 0536   BP: 133/65   Pulse: (!) 107   Patient Position - Orthostatic VS: Sitting         Visual Acuity      ED Medications  Medications   haloperidol lactate (HALDOL) injection 10 mg (10 mg Intramuscular Given 9/21/23 0527)   midazolam (VERSED) injection 2 mg (2 mg Intramuscular Given 9/21/23 0527)       Diagnostic Studies  Results Reviewed     Procedure Component Value Units Date/Time    Comprehensive metabolic panel [[id number]]  (Abnormal) Collected: 09/21/23 0600    Lab Status: Final result Specimen: Blood from Arm, Right Updated: 09/21/23 0629     Sodium 134 mmol/L      Potassium 3.3 mmol/L      Chloride 102 mmol/L      CO2 20 mmol/L      ANION GAP 12 mmol/L      BUN 40 mg/dL      Creatinine 2.22 mg/dL      Glucose 102 mg/dL      Calcium 9.3 mg/dL      AST 32 U/L      ALT 25 U/L      Alkaline Phosphatase 69 U/L      Total Protein 7.1 g/dL      Albumin 4.0 g/dL      Total Bilirubin 0.86 mg/dL      eGFR 33 ml/min/1.73sq m     Narrative:      [facility] (CKD):   •  Stage 1 with normal or high GFR (GFR > 90 mL/min/1.73 square meters)  •  Stage 2 Mild CKD (GFR = 60-89 mL/min/1.73 square meters)  •  Stage 3A Moderate CKD (GFR = 45-59 mL/min/1.73 square meters)  •  Stage 3B Moderate CKD (GFR = 30-44 mL/min/1.73 square meters)  •  Stage 4 Severe CKD (GFR = 15-29 mL/min/1.73 square meters)  •  Stage 5 End Stage CKD (GFR <15 mL/min/1.73 square meters)  Note: GFR calculation is accurate only with a steady state creatinine    Ethanol [[id number]]  (Normal) Collected: 09/21/23 0600    Lab Status: Final result Specimen: Blood from Arm, Right Updated: 09/21/23 0628     Ethanol Lvl <10 mg/dL     CBC and differential [[id number]]  (Abnormal) Collected: 09/21/23 0600    Lab Status: Final result Specimen: Blood from Arm, Right Updated: 09/21/23 0606     WBC 11.18 Thousand/uL      RBC 4.91 Million/uL      Hemoglobin 15.3 g/dL      Hematocrit 43.8 %      MCV 89 fL      MCH 31.2 pg      MCHC 34.9 g/dL      RDW 12.8 %      MPV 10.0 fL      Platelets 078 Thousands/uL      nRBC 0 /100 WBCs      Neutrophils Relative 78 %      Immat GRANS % 0 %      Lymphocytes Relative 14 %      Monocytes Relative 7 %      Eosinophils Relative 0 %      Basophils Relative 1 %      Neutrophils Absolute 8.64 Thousands/µL      Immature Grans Absolute 0.04 Thousand/uL      Lymphocytes Absolute 1.58 Thousands/µL      Monocytes Absolute 0.83 Thousand/µL      Eosinophils Absolute 0.02 Thousand/µL      Basophils Absolute 0.07 Thousands/µL     UA w Reflex to Microscopic w Reflex to Culture [[id number]]     Lab Status: No result Specimen: Urine     Rapid drug screen, urine [[id number]]     Lab Status: No result Specimen: Urine     POCT alcohol breath test [[id number]]     Lab Status: No result                  No orders to display              Procedures  Procedures         ED Course  ED Course as of 09/21/23 0640   Thu Sep 21, 2023   0522 Patient with increasing agitation. Found to have knife on him. Will plan to chemically sedate    0607 Hemoglobin: 15.3  Normal                                             Medical Decision Making  18-year-old male presenting to the ED today for acute psychosis. We will do screening labs for medical causes. We will have crisis see in the morning. Patient needing chemical restraint secondary to agitation. Patient signed out prior to final disposition. Upon review patient was discharged after crisis evaluation. Likely recreational drug induced psychosis. Amount and/or Complexity of Data Reviewed  Labs: ordered. Decision-making details documented in ED Course. Risk  Prescription drug management.           Disposition  Final diagnoses:   Psychoses ([address])   ROB (acute kidney injury) ([address])     Time reflects when diagnosis was documented in both MDM as applicable and the Disposition within this note     Time User Action Codes Description Comment    9/21/2023  6:37 AM Colletta Picket Add [F29] Psychoses ([address])     9/21/2023  6:38 AM Colletta Picket Add [N17.9] ROB (acute kidney injury) [facility])       ED Disposition     ED Disposition Transfer to [facility]    Condition   --    Date/Time   Thu Sep 21, 2023  6:39 AM    Comment   Keisha Daniel should be transferred out to appropriate psychiatric facility if deemed appropriate after crisis evaluation and has been medically cleared. Follow-up Information    None         Patient's Medications   Discharge Prescriptions    No medications on file       No discharge procedures on file.     PDMP Review       Value Time User    PDMP Reviewed  Yes 10/26/2022  1:38 PM Lex Bueno MD          ED Provider  Electronically Signed by           Gladys Bo MD  09/21/23 3879

## 2023-09-21 NOTE — Clinical Note
Jean Castillo should be transferred out to appropriate psychiatric facility if deemed appropriate after crisis evaluation and has been medically cleared.

## 2023-09-21 NOTE — ED NOTES
About 17:00 - dinner arrived - PES woke up patient who ate some of the tray; went to the bathroom and fell back asleep immediately after returning to his room. 20:50 - PES escorted Rn staff to get vitals - patient awake and asking to leave - assessment completed @ this time. 52 yo SWM brought to ER via police with a broom - patient reportedly called 911 because he thought someone @ the [employer] needed help - patient may have been impaired by use of methamphetamine which he admitted to "snorting a few days ago". The patient is known to PES by previous ER contacts which were also substance related. Mood is "alright". There are no known stressors other than "the patient thinking someone was in need of help". Mood is "alright". Symptoms include: "the patient thought he was in the ER for 3 days. The patient denies: psychosis; paranoia; all lethality; any change with appetite/sleep/concentration/energy; anxiety. The patient did not request any services other than a d.c home. Patient is followed by a psychiatrist via [facility]. PES ordered a Lyft home.

## 2023-09-21 NOTE — ED NOTES
9/21/23 @ 0845:  Patient moved to CT2 due to medical issues. PES will continue to monitor. Tammy, MS    1300: Patient moved back to room 20, but is far too lethargic to complete crisis assessment.   Of note, patient tested positive for Methamphetamines, so that may be the cause of his psychotic behaviiors

## 2023-09-22 NOTE — ED PROVIDER NOTES
Patient awake and alert, denies any complaints, requesting to be discharged. Seen by ED crisis worker, patient denying any suicidal homicidal ideation. Patient awake and alert, answering questions appropriately, able to ambulate without difficulty. Ride arranged to take patient home.      Jenny Rodriguez MD  09/21/23 2120

## 2023-09-22 NOTE — ED NOTES
Patient has been sleeping most of the day, woken to take vital signs and asked when he can leave, crisis worker in formed as soon as he speaks with him things can start to progress, patient wants to speak with crisis worker now. Crisis worker will be back in to speak with patient. Patient noted to not have eaten very much of his dinner tray. Vital signs obtained and are within normal limits.      Haylee Castro RN  09/21/23 2056

## 2023-10-20 NOTE — ED NOTES
EMILEE at bedside, patient awake and alert, calm and cooperative at this time     Lauro Flaherty RN  07/05/19 6737 Epidermal Autograft Text: The defect edges were debeveled with a #15 scalpel blade.  Given the location of the defect, shape of the defect and the proximity to free margins an epidermal autograft was deemed most appropriate.  Using a sterile surgical marker, the primary defect shape was transferred to the donor site. The epidermal graft was then harvested.  The skin graft was then placed in the primary defect and oriented appropriately.

## 2024-02-09 ENCOUNTER — HOSPITAL ENCOUNTER (EMERGENCY)
Facility: HOSPITAL | Age: 49
Discharge: HOME | End: 2024-02-09
Attending: EMERGENCY MEDICINE
Payer: MEDICARE

## 2024-02-09 VITALS
HEART RATE: 111 BPM | OXYGEN SATURATION: 96 % | RESPIRATION RATE: 24 BRPM | SYSTOLIC BLOOD PRESSURE: 106 MMHG | DIASTOLIC BLOOD PRESSURE: 74 MMHG

## 2024-02-09 DIAGNOSIS — F19.929 DRUG INTOXICATION (HCC): Primary | ICD-10-CM

## 2024-02-09 LAB
ALBUMIN SERPL BCP-MCNC: 4.5 G/DL (ref 3.5–5)
ALP SERPL-CCNC: 73 U/L (ref 34–104)
ALT SERPL W P-5'-P-CCNC: 29 U/L (ref 7–52)
AMPHETAMINES SERPL QL SCN: POSITIVE
ANION GAP SERPL CALCULATED.3IONS-SCNC: 27 MMOL/L
AST SERPL W P-5'-P-CCNC: 36 U/L (ref 13–39)
BARBITURATES UR QL: NEGATIVE
BASOPHILS # BLD AUTO: 0.09 THOUSANDS/ÂΜL (ref 0–0.1)
BASOPHILS NFR BLD AUTO: 1 % (ref 0–1)
BENZODIAZ UR QL: POSITIVE
BILIRUB SERPL-MCNC: 0.89 MG/DL (ref 0.2–1)
BUN SERPL-MCNC: 25 MG/DL (ref 5–25)
CALCIUM SERPL-MCNC: 9.3 MG/DL (ref 8.4–10.2)
CHLORIDE SERPL-SCNC: 104 MMOL/L (ref 96–108)
CK SERPL-CCNC: 522 U/L (ref 39–308)
CO2 SERPL-SCNC: 11 MMOL/L (ref 21–32)
COCAINE UR QL: NEGATIVE
CREAT SERPL-MCNC: 2.09 MG/DL (ref 0.6–1.3)
EOSINOPHIL # BLD AUTO: 0.01 THOUSAND/ÂΜL (ref 0–0.61)
EOSINOPHIL NFR BLD AUTO: 0 % (ref 0–6)
ERYTHROCYTE [DISTWIDTH] IN BLOOD BY AUTOMATED COUNT: 12.6 % (ref 11.6–15.1)
GFR SERPL CREATININE-BSD FRML MDRD: 36 ML/MIN/1.73SQ M
GLUCOSE SERPL-MCNC: 177 MG/DL (ref 65–140)
HCT VFR BLD AUTO: 47.8 % (ref 36.5–49.3)
HGB BLD-MCNC: 16.8 G/DL (ref 12–17)
IMM GRANULOCYTES # BLD AUTO: 0.03 THOUSAND/UL (ref 0–0.2)
IMM GRANULOCYTES NFR BLD AUTO: 0 % (ref 0–2)
LYMPHOCYTES # BLD AUTO: 1.22 THOUSANDS/ÂΜL (ref 0.6–4.47)
LYMPHOCYTES NFR BLD AUTO: 11 % (ref 14–44)
MCH RBC QN AUTO: 31.8 PG (ref 26.8–34.3)
MCHC RBC AUTO-ENTMCNC: 35.1 G/DL (ref 31.4–37.4)
MCV RBC AUTO: 90 FL (ref 82–98)
METHADONE UR QL: NEGATIVE
MONOCYTES # BLD AUTO: 0.67 THOUSAND/ÂΜL (ref 0.17–1.22)
MONOCYTES NFR BLD AUTO: 6 % (ref 4–12)
NEUTROPHILS # BLD AUTO: 9.01 THOUSANDS/ÂΜL (ref 1.85–7.62)
NEUTS SEG NFR BLD AUTO: 82 % (ref 43–75)
NRBC BLD AUTO-RTO: 0 /100 WBCS
OPIATES UR QL SCN: NEGATIVE
OXYCODONE+OXYMORPHONE UR QL SCN: NEGATIVE
PCP UR QL: NEGATIVE
PLATELET # BLD AUTO: 453 THOUSANDS/UL (ref 149–390)
PMV BLD AUTO: 10.7 FL (ref 8.9–12.7)
POTASSIUM SERPL-SCNC: 4.3 MMOL/L (ref 3.5–5.3)
PROT SERPL-MCNC: 7.7 G/DL (ref 6.4–8.4)
RBC # BLD AUTO: 5.29 MILLION/UL (ref 3.88–5.62)
SODIUM SERPL-SCNC: 142 MMOL/L (ref 135–147)
THC UR QL: POSITIVE
WBC # BLD AUTO: 11.03 THOUSAND/UL (ref 4.31–10.16)

## 2024-02-09 PROCEDURE — 36415 COLL VENOUS BLD VENIPUNCTURE: CPT | Performed by: EMERGENCY MEDICINE

## 2024-02-09 PROCEDURE — 85025 COMPLETE CBC W/AUTO DIFF WBC: CPT | Performed by: EMERGENCY MEDICINE

## 2024-02-09 PROCEDURE — 96361 HYDRATE IV INFUSION ADD-ON: CPT

## 2024-02-09 PROCEDURE — 99291 CRITICAL CARE FIRST HOUR: CPT | Performed by: EMERGENCY MEDICINE

## 2024-02-09 PROCEDURE — 96374 THER/PROPH/DIAG INJ IV PUSH: CPT

## 2024-02-09 PROCEDURE — 99285 EMERGENCY DEPT VISIT HI MDM: CPT

## 2024-02-09 PROCEDURE — 80053 COMPREHEN METABOLIC PANEL: CPT | Performed by: EMERGENCY MEDICINE

## 2024-02-09 PROCEDURE — 96375 TX/PRO/DX INJ NEW DRUG ADDON: CPT

## 2024-02-09 PROCEDURE — 80307 DRUG TEST PRSMV CHEM ANLYZR: CPT | Performed by: EMERGENCY MEDICINE

## 2024-02-09 PROCEDURE — 82550 ASSAY OF CK (CPK): CPT | Performed by: EMERGENCY MEDICINE

## 2024-02-09 RX ORDER — MIDAZOLAM HYDROCHLORIDE 2 MG/2ML
2 INJECTION, SOLUTION INTRAMUSCULAR; INTRAVENOUS ONCE
Status: DISCONTINUED | OUTPATIENT
Start: 2024-02-09 | End: 2024-02-09 | Stop reason: HOSPADM

## 2024-02-09 RX ORDER — ONDANSETRON 2 MG/ML
4 INJECTION INTRAMUSCULAR; INTRAVENOUS ONCE
Status: COMPLETED | OUTPATIENT
Start: 2024-02-09 | End: 2024-02-09

## 2024-02-09 RX ORDER — MIDAZOLAM HYDROCHLORIDE 2 MG/2ML
4 INJECTION, SOLUTION INTRAMUSCULAR; INTRAVENOUS ONCE
Status: COMPLETED | OUTPATIENT
Start: 2024-02-09 | End: 2024-02-09

## 2024-02-09 RX ORDER — HALOPERIDOL 5 MG/ML
10 INJECTION INTRAMUSCULAR ONCE
Status: COMPLETED | OUTPATIENT
Start: 2024-02-09 | End: 2024-02-09

## 2024-02-09 RX ADMIN — HALOPERIDOL LACTATE 10 MG: 5 INJECTION, SOLUTION INTRAMUSCULAR at 18:23

## 2024-02-09 RX ADMIN — ONDANSETRON 4 MG: 2 INJECTION INTRAMUSCULAR; INTRAVENOUS at 18:46

## 2024-02-09 RX ADMIN — MIDAZOLAM 4 MG: 1 INJECTION INTRAMUSCULAR; INTRAVENOUS at 18:24

## 2024-02-09 RX ADMIN — SODIUM CHLORIDE 1000 ML: 0.9 INJECTION, SOLUTION INTRAVENOUS at 18:46

## 2024-02-09 NOTE — RESTRAINT FACE TO FACE
Restraint Face to Face   Milan Baig 48 y.o. male MRN: [id number]  Unit/Bed#: ED 08 Encounter: [id number]      Physical Evaluation-agitated, yelling, thrashing limbs  Purpose for Restraints/ Seclusion High risk for self harm, High risk for causing significant disruption of treatment environment , High risk for harm to others, and high risk for flight  Patient's reaction to the intervention-initially screaming, thrashing around.  Restraints, improvement following medication  Patient's medical condition-agitated and excited delirium state  Patient's Behavioral condition-agitated, psychotic  Restraints to be Continued

## 2024-02-10 NOTE — DISCHARGE INSTRUCTIONS
Feeling thoughts of hurting yourself or hurting anyone else, if you do not feel safe at home, or if you are seeking drug counseling, return to the emergency department.

## 2024-02-10 NOTE — ED PROVIDER NOTES
History  Chief Complaint   Patient presents with    Altered Mental Status     Pt brought in by PD hallucinating and combative     HPI  Patient is a 48-year-old male history of multiple sclerosis, psychiatric disorder presenting for evaluation of agitation.  Patient was apparently at a bagel shop when he began having visual hallucinations of snakes around him, screaming, combative requiring multiple state police to restrain him.  Patient remains anxious, intermittently screaming in the emergency department, stating that he sees snakes.  Patient admits to remote history of methamphetamine abuse, picking at skin, thrashing around, diaphoretic, tachycardic, but denying any meth use today.  Patient denies chest pain, shortness of breath, headache, abdominal pain, nausea, vomiting.  Prior to Admission Medications   Prescriptions Last Dose Informant Patient Reported? Taking?   Ponesimod (Ponvory) 20 MG TABS   No No   Sig: Take 20 mg by mouth in the morning Start after completion of starter pack.   Ponesimod Starter Pack (Ponvory Starter Pack) 2-6-1-5-6-7-8-9 & 10 MG TBPK   No No   Sig: Take 2 mg by mouth in the morning Please follow instructions of starter pack. Day 15th is when you take 20mg a day.   Vraylar 3 MG capsule   Yes No   Sig: Take 6 mg by mouth daily   ergocalciferol (VITAMIN D2) 50,000 units   No No   Sig: Take 1 capsule (50,000 Units total) by mouth once a week   Patient not taking: Reported on 5/18/2023   nicotine polacrilex (COMMIT) 4 MG lozenge   No No   Sig: Apply 1 lozenge (4 mg total) to the mouth or throat as needed for smoking cessation   Patient not taking: Reported on 5/18/2023      Facility-Administered Medications: None       Past Medical History:   Diagnosis Date    Multiple sclerosis (HCC)     Psychiatric disorder        Past Surgical History:   Procedure Laterality Date    HAND SURGERY         Family History   Problem Relation Age of Onset    Diabetes Mother     Crohn's disease Father      I  have reviewed and agree with the history as documented.    E-Cigarette/Vaping    E-Cigarette Use Never User      E-Cigarette/Vaping Substances    Nicotine No     THC No     CBD No     Other No     Unknown No      Social History     Tobacco Use    Smoking status: Every Day     Current packs/day: 0.50     Types: Cigarettes    Smokeless tobacco: Current   Vaping Use    Vaping status: Never Used   Substance Use Topics    Alcohol use: No    Drug use: Not Currently     Comment: sts he quit drugs       Review of Systems   Constitutional:  Negative for chills, fatigue and fever.   Gastrointestinal:  Negative for diarrhea, nausea and vomiting.   Skin:  Negative for color change, pallor, rash and wound.   Neurological:  Negative for headaches.   Psychiatric/Behavioral:  Negative for confusion.        Physical Exam  Physical Exam    Vital Signs  ED Triage Vitals [02/09/24 1900]   Temp Pulse Respirations Blood Pressure SpO2   -- (!) 128 (!) 32 112/59 90 %      Temp src Heart Rate Source Patient Position - Orthostatic VS BP Location FiO2 (%)   -- -- -- -- --      Pain Score       --           Vitals:    02/09/24 1900 02/09/24 1930 02/09/24 2000 02/09/24 2030   BP: 112/59 103/61 104/68 106/74   Pulse: (!) 128 (!) 123 (!) 122 (!) 111         Visual Acuity      ED Medications  Medications   midazolam (VERSED) injection 2 mg (2 mg Intravenous Not Given 2/9/24 2018)   haloperidol lactate (HALDOL) injection 10 mg (10 mg Intravenous Given 2/9/24 1823)   midazolam (VERSED) injection 4 mg (4 mg Intravenous Given 2/9/24 1824)   ondansetron (ZOFRAN) injection 4 mg (4 mg Intravenous Given 2/9/24 1846)   sodium chloride 0.9 % bolus 1,000 mL (0 mL Intravenous Stopped 2/9/24 2014)       Diagnostic Studies  Results Reviewed       Procedure Component Value Units Date/Time    Rapid drug screen, urine [[id number]]  (Abnormal) Collected: 02/09/24 2022    Lab Status: Final result Specimen: Urine, Clean Catch Updated: 02/09/24 2045     Amph/Meth UR  Positive     Barbiturate Ur Negative     Benzodiazepine Urine Positive     Cocaine Urine Negative     Methadone Urine Negative     Opiate Urine Negative     PCP Ur Negative     THC Urine Positive     Oxycodone Urine Negative    Narrative:      Presumptive report. If requested, specimen will be sent to reference lab for confirmation.  FOR MEDICAL PURPOSES ONLY.   IF CONFIRMATION NEEDED PLEASE CONTACT THE LAB WITHIN 5 DAYS.    Drug Screen Cutoff Levels:  AMPHETAMINE/METHAMPHETAMINES  1000 ng/mL  BARBITURATES     200 ng/mL  BENZODIAZEPINES     200 ng/mL  COCAINE      300 ng/mL  METHADONE      300 ng/mL  OPIATES      300 ng/mL  PHENCYCLIDINE     25 ng/mL  THC       50 ng/mL  OXYCODONE      100 ng/mL    Comprehensive metabolic panel [[id number]]  (Abnormal) Collected: 02/09/24 1845    Lab Status: Final result Specimen: Blood from Arm, Right Updated: 02/09/24 1907     Sodium 142 mmol/L      Potassium 4.3 mmol/L      Chloride 104 mmol/L      CO2 11 mmol/L      ANION GAP 27 mmol/L      BUN 25 mg/dL      Creatinine 2.09 mg/dL      Glucose 177 mg/dL      Calcium 9.3 mg/dL      AST 36 U/L      ALT 29 U/L      Alkaline Phosphatase 73 U/L      Total Protein 7.7 g/dL      Albumin 4.5 g/dL      Total Bilirubin 0.89 mg/dL      eGFR 36 ml/min/1.73sq m     Narrative:      National Kid[employer] Disease Foundation guidelines for Chronic Kidney Disease (CKD):     Stage 1 with normal or high GFR (GFR > 90 mL/min/1.73 square meters)    Stage 2 Mild CKD (GFR = 60-89 mL/min/1.73 square meters)    Stage 3A Moderate CKD (GFR = 45-59 mL/min/1.73 square meters)    Stage 3B Moderate CKD (GFR = 30-44 mL/min/1.73 square meters)    Stage 4 Severe CKD (GFR = 15-29 mL/min/1.73 square meters)    Stage 5 End Stage CKD (GFR <15 mL/min/1.73 square meters)  Note: GFR calculation is accurate only with a steady state creatinine    CK [[id number]]  (Abnormal) Collected: 02/09/24 1845    Lab Status: Final result Specimen: Blood from Arm, Right Updated: 02/09/24 1907      Total  U/L     CBC and differential [[id number]]  (Abnormal) Collected: 02/09/24 1845    Lab Status: Final result Specimen: Blood from Arm, Right Updated: 02/09/24 1852     WBC 11.03 Thousand/uL      RBC 5.29 Million/uL      Hemoglobin 16.8 g/dL      Hematocrit 47.8 %      MCV 90 fL      MCH 31.8 pg      MCHC 35.1 g/dL      RDW 12.6 %      MPV 10.7 fL      Platelets 453 Thousands/uL      nRBC 0 /100 WBCs      Neutrophils Relative 82 %      Immat GRANS % 0 %      Lymphocytes Relative 11 %      Monocytes Relative 6 %      Eosinophils Relative 0 %      Basophils Relative 1 %      Neutrophils Absolute 9.01 Thousands/µL      Immature Grans Absolute 0.03 Thousand/uL      Lymphocytes Absolute 1.22 Thousands/µL      Monocytes Absolute 0.67 Thousand/µL      Eosinophils Absolute 0.01 Thousand/µL      Basophils Absolute 0.09 Thousands/µL                    No orders to display              Procedures  CriticalCare Time    Date/Time: 2/9/2024 9:40 PM    Performed by: Humphrey Leon MD  Authorized by: Humphrey Leon MD    Critical care provider statement:     Critical care time (minutes):  52    Critical care was necessary to treat or prevent imminent or life-threatening deterioration of the following conditions:  Toxidrome and CNS failure or compromise    Critical care was time spent personally by me on the following activities:  Blood draw for specimens, obtaining history from patient or surrogate, ordering and performing treatments and interventions, development of treatment plan with patient or surrogate, evaluation of patient's response to treatment, examination of patient, review of old charts, re-evaluation of patient's condition and ordering and review of laboratory studies           ED Course                                             Medical Decision Making  I obtained limited history from the patient and additional history from the police.  Patient agitated, scratching at self, and visually  hallucinating with paranoid delusions all strongly suspicious for methamphetamine intoxication however patient denying this at this time.  Patient initially agitated and posing a risk to himself and ED staff.  Patient subsequently placed in locked limb restraints and sedated with Haldol, Versed with rapid improvement of mental status.  Patient initially in sinus tachycardia rate of the 150s which improved to rate of 110 over the course of stay in the emergency department.  Patient with a mildly elevated creatinine kinase and subsequently received 1 L normal saline in the emergency department.  On reassessment, patient awake, alert, pleasant, interactive, continuing to deny methamphetamine use however patient's rapid drug screen positive for amphetamine/meth, benzodiazepines, and THC.  Patient refusing to talk to ED crisis worker and [facility] worker who attempted to talk to patient at bedside, requesting discharge.  Patient awake, alert, ambulatory without ataxia at this time.  Urged patient to return to the emergency department for drug and psychiatric counseling prior to discharge.     Amount and/or Complexity of Data Reviewed  Labs: ordered.    Risk  Prescription drug management.             Disposition  Final diagnoses:   Drug intoxication (HCC)     Time reflects when diagnosis was documented in both MDM as applicable and the Disposition within this note       Time User Action Codes Description Comment    2/9/2024  8:57 PM Humphrey Leon Add [F19.929] Drug intoxication (HCC)           ED Disposition       ED Disposition   Discharge    Condition   Stable    Date/Time   Fri Feb 9, 2024  8:57 PM    Comment   Milan Baig discharge to home/self care.                   Follow-up Information       Follow up With Specialties Details Why Contact Info Additional Information    [facility] Emergency Department Emergency Medicine  If symptoms worsen [address] [facility]  [address]  [phone] [facility] Emergency Department, [address], [place], [address]            Discharge Medication List as of 2/9/2024  9:06 PM        CONTINUE these medications which have NOT CHANGED    Details   ergocalciferol (VITAMIN D2) 50,000 units Take 1 capsule (50,000 Units total) by mouth once a week, Starting Wed 9/28/2022, Normal      nicotine polacrilex (COMMIT) 4 MG lozenge Apply 1 lozenge (4 mg total) to the mouth or throat as needed for smoking cessation, Starting Tue 3/28/2023, Normal      Ponesimod (Ponvory) 20 MG TABS Take 20 mg by mouth in the morning Start after completion of starter pack., Starting Mon 5/22/2023, Normal      Ponesimod Starter Pack (Ponvory Starter Pack) 7-2-1-5-6-7-8-9 & 10 MG TBPK Take 2 mg by mouth in the morning Please follow instructions of starter pack. Day 15th is when you take 20mg a day., Starting Mon 5/22/2023, Normal      Vraylar 3 MG capsule Take 6 mg by mouth daily, Starting Mon 11/15/2021, Historical Med             No discharge procedures on file.    PDMP Review         Value Time User    PDMP Reviewed  Yes 10/26/2022  1:38 PM Yobani Lim MD            ED Provider  Electronically Signed by             Humphrey Leon MD  02/09/24 [id number]

## 2024-03-18 ENCOUNTER — HOSPITAL ENCOUNTER (EMERGENCY)
Facility: HOSPITAL | Age: 49
Discharge: LEFT BEFORE DISCHARGE | End: 2024-03-18
Attending: EMERGENCY MEDICINE
Payer: MEDICARE

## 2024-03-18 VITALS
TEMPERATURE: 98.6 F | DIASTOLIC BLOOD PRESSURE: 78 MMHG | SYSTOLIC BLOOD PRESSURE: 132 MMHG | OXYGEN SATURATION: 95 % | HEART RATE: 127 BPM | RESPIRATION RATE: 20 BRPM

## 2024-03-18 DIAGNOSIS — N17.9 AKI (ACUTE KIDNEY INJURY) (HCC): Primary | ICD-10-CM

## 2024-03-18 DIAGNOSIS — F15.10 METHAMPHETAMINE ABUSE (HCC): ICD-10-CM

## 2024-03-18 LAB
ALBUMIN SERPL BCP-MCNC: 4.5 G/DL (ref 3.5–5)
ALP SERPL-CCNC: 69 U/L (ref 34–104)
ALT SERPL W P-5'-P-CCNC: 26 U/L (ref 7–52)
ANION GAP SERPL CALCULATED.3IONS-SCNC: 16 MMOL/L (ref 4–13)
AST SERPL W P-5'-P-CCNC: 41 U/L (ref 13–39)
BASOPHILS # BLD AUTO: 0.1 THOUSANDS/ÂΜL (ref 0–0.1)
BASOPHILS NFR BLD AUTO: 1 % (ref 0–1)
BILIRUB SERPL-MCNC: 1.11 MG/DL (ref 0.2–1)
BUN SERPL-MCNC: 33 MG/DL (ref 5–25)
CALCIUM SERPL-MCNC: 10 MG/DL (ref 8.4–10.2)
CHLORIDE SERPL-SCNC: 102 MMOL/L (ref 96–108)
CK SERPL-CCNC: 1353 U/L (ref 39–308)
CO2 SERPL-SCNC: 17 MMOL/L (ref 21–32)
CREAT SERPL-MCNC: 2.78 MG/DL (ref 0.6–1.3)
EOSINOPHIL # BLD AUTO: 0.01 THOUSAND/ÂΜL (ref 0–0.61)
EOSINOPHIL NFR BLD AUTO: 0 % (ref 0–6)
ERYTHROCYTE [DISTWIDTH] IN BLOOD BY AUTOMATED COUNT: 13 % (ref 11.6–15.1)
ETHANOL SERPL-MCNC: <10 MG/DL
GFR SERPL CREATININE-BSD FRML MDRD: 25 ML/MIN/1.73SQ M
GLUCOSE SERPL-MCNC: 72 MG/DL (ref 65–140)
HCT VFR BLD AUTO: 45.2 % (ref 36.5–49.3)
HGB BLD-MCNC: 15.8 G/DL (ref 12–17)
IMM GRANULOCYTES # BLD AUTO: 0.05 THOUSAND/UL (ref 0–0.2)
IMM GRANULOCYTES NFR BLD AUTO: 0 % (ref 0–2)
LYMPHOCYTES # BLD AUTO: 2.27 THOUSANDS/ÂΜL (ref 0.6–4.47)
LYMPHOCYTES NFR BLD AUTO: 14 % (ref 14–44)
MCH RBC QN AUTO: 31.3 PG (ref 26.8–34.3)
MCHC RBC AUTO-ENTMCNC: 35 G/DL (ref 31.4–37.4)
MCV RBC AUTO: 90 FL (ref 82–98)
MONOCYTES # BLD AUTO: 1.6 THOUSAND/ÂΜL (ref 0.17–1.22)
MONOCYTES NFR BLD AUTO: 10 % (ref 4–12)
NEUTROPHILS # BLD AUTO: 12.43 THOUSANDS/ÂΜL (ref 1.85–7.62)
NEUTS SEG NFR BLD AUTO: 75 % (ref 43–75)
NRBC BLD AUTO-RTO: 0 /100 WBCS
PLATELET # BLD AUTO: 357 THOUSANDS/UL (ref 149–390)
PMV BLD AUTO: 10.6 FL (ref 8.9–12.7)
POTASSIUM SERPL-SCNC: 4.1 MMOL/L (ref 3.5–5.3)
PROT SERPL-MCNC: 7.5 G/DL (ref 6.4–8.4)
RBC # BLD AUTO: 5.05 MILLION/UL (ref 3.88–5.62)
SODIUM SERPL-SCNC: 135 MMOL/L (ref 135–147)
WBC # BLD AUTO: 16.46 THOUSAND/UL (ref 4.31–10.16)

## 2024-03-18 PROCEDURE — 99285 EMERGENCY DEPT VISIT HI MDM: CPT

## 2024-03-18 PROCEDURE — 82550 ASSAY OF CK (CPK): CPT | Performed by: EMERGENCY MEDICINE

## 2024-03-18 PROCEDURE — 99285 EMERGENCY DEPT VISIT HI MDM: CPT | Performed by: EMERGENCY MEDICINE

## 2024-03-18 PROCEDURE — 82077 ASSAY SPEC XCP UR&BREATH IA: CPT | Performed by: EMERGENCY MEDICINE

## 2024-03-18 PROCEDURE — 36415 COLL VENOUS BLD VENIPUNCTURE: CPT | Performed by: EMERGENCY MEDICINE

## 2024-03-18 PROCEDURE — 85025 COMPLETE CBC W/AUTO DIFF WBC: CPT | Performed by: EMERGENCY MEDICINE

## 2024-03-18 PROCEDURE — 80053 COMPREHEN METABOLIC PANEL: CPT | Performed by: EMERGENCY MEDICINE

## 2024-03-18 RX ADMIN — SODIUM CHLORIDE 1000 ML: 0.9 INJECTION, SOLUTION INTRAVENOUS at 15:47

## 2024-03-18 RX ADMIN — SODIUM CHLORIDE 1000 ML: 0.9 INJECTION, SOLUTION INTRAVENOUS at 15:54

## 2024-03-18 NOTE — DISCHARGE INSTRUCTIONS
You are leaving the hospital AGAINST MEDICAL ADVIC.  We encourage you to return to the hospital for further testing, fluids, and likely hospital admission given your abnormal lab testing for your kidneys.  Please follow-up with your primary care provider in the next week for reassessment.

## 2024-03-18 NOTE — ED NOTES
3/18/24: This writer discussed the patients current presentation and recommended discharge plan with   They agree with the patient being discharged at this time with referrals and/or information about DRUG USE    The patient was Instructed to follow up with their PCP AND PSYCHIATRIST    This writer and the patient completed a safety plan.   In addition, the patient was instructed to call local [facility] crisis, other crisis services, 911 or to go to the nearest ER immediately if their situation changes at any time.     This writer discussed discharge plans with the patient         SAFETY PLAN  Warning Signs (thoughts, images, mood, behavior, situations) of a potential crisis:      Coping Skills (what can I do to take my mind off the problem, or to keep myself safe):      Outside Support (who can I reach out to for support and help):         National Suicide Prevention Hotline:  [address] [place] [address] [phone] - [facility] Crisis         MS Tammy

## 2024-03-18 NOTE — ED NOTES
"3/18/24 @ 1315:  48-year-old male brought to ED by Police after he called 911 reporting that \"someone is tampering with my gas lines.\" Patient presents seemingly intoxicated and admits to Methampletamine use \"a coupld of days ago, but not today.\" Patient was displaying all the telltale signs of current, if not use today. He was snorting, couldn't stay still, hands were flailing, kept rubbing his nose (patient admits to snorting Meth), rapid and pressured speech. Patient denies any SI/HI, intent or plan. Patients lab results indicated that he would benefit from inpatient treatment, but he only agreed to receiving 2 liters of fluid. PES and ED MD offered [facility] assistance, but patient adamantly declined. Patient says he's been taking his medication as prescribed and continues to see his psychiatrist at [facility] outpatient program.     MS Tammy  "

## 2024-03-18 NOTE — ED PROVIDER NOTES
"History  Chief Complaint   Patient presents with    Psychiatric Evaluation     Brought in by PD for aggressive and violent behavior. \"States someone damaged gas lines\"     HPI  Patient is a 48-year-old male history of multiple sclerosis, methamphetamine abuse presenting for evaluation of agitation.  Patient apparently called the police out of concern that people were tampering with his gas lines.  Patient emotionally labile and aggressive with the police who ultimately determined that it was safest to bring him into the emergency department.  Patient admits to using methamphetamine a few days ago but denies any use today.  Patient denies SI/HI/AH/VH.  Patient somewhat labile in the emergency department and frustrated that he was brought here by police.  Patient denies additional medical complaint at this time.  Prior to Admission Medications   Prescriptions Last Dose Informant Patient Reported? Taking?   Ponesimod (Ponvory) 20 MG TABS   No No   Sig: Take 20 mg by mouth in the morning Start after completion of starter pack.   Ponesimod Starter Pack (Ponvory Starter Pack) 5-3-4-5-6-7-8-9 & 10 MG TBPK   No No   Sig: Take 2 mg by mouth in the morning Please follow instructions of starter pack. Day 15th is when you take 20mg a day.   Vraylar 3 MG capsule   Yes No   Sig: Take 6 mg by mouth daily   ergocalciferol (VITAMIN D2) 50,000 units   No No   Sig: Take 1 capsule (50,000 Units total) by mouth once a week   Patient not taking: Reported on 5/18/2023   nicotine polacrilex (COMMIT) 4 MG lozenge   No No   Sig: Apply 1 lozenge (4 mg total) to the mouth or throat as needed for smoking cessation   Patient not taking: Reported on 5/18/2023      Facility-Administered Medications: None       Past Medical History:   Diagnosis Date    Multiple sclerosis (HCC)     Psychiatric disorder        Past Surgical History:   Procedure Laterality Date    HAND SURGERY         Family History   Problem Relation Age of Onset    Diabetes Mother  "    Crohn's disease Father      I have reviewed and agree with the history as documented.    E-Cigarette/Vaping    E-Cigarette Use Never User      E-Cigarette/Vaping Substances    Nicotine No     THC No     CBD No     Other No     Unknown No      Social History     Tobacco Use    Smoking status: Every Day     Current packs/day: 0.50     Types: Cigarettes    Smokeless tobacco: Current   Vaping Use    Vaping status: Never Used   Substance Use Topics    Alcohol use: No    Drug use: Not Currently     Comment: sts he quit drugs       Review of Systems   Constitutional:  Negative for chills and fever.   Respiratory:  Negative for cough and shortness of breath.    Gastrointestinal:  Negative for diarrhea, nausea and vomiting.   Musculoskeletal:  Negative for arthralgias and myalgias.   Neurological:  Negative for headaches.   Psychiatric/Behavioral:  Positive for agitation and dysphoric mood. Negative for confusion, hallucinations, self-injury, sleep disturbance and suicidal ideas. The patient is nervous/anxious.    All other systems reviewed and are negative.      Physical Exam  Physical Exam  Vitals and nursing note reviewed.   Constitutional:       General: He is not in acute distress.     Appearance: He is well-developed. He is not diaphoretic.      Comments: Somewhat disheveled appearing but nontoxic nondistressed   HENT:      Head: Normocephalic and atraumatic.      Comments: Moist mucous membranes     Right Ear: External ear normal.      Left Ear: External ear normal.      Nose: Nose normal.      Mouth/Throat:      Pharynx: No oropharyngeal exudate.   Eyes:      Conjunctiva/sclera: Conjunctivae normal.      Pupils: Pupils are equal, round, and reactive to light.   Cardiovascular:      Rate and Rhythm: Normal rate and regular rhythm.      Heart sounds: Normal heart sounds. No murmur heard.     No friction rub. No gallop.      Comments: Sinus tachycardia rate of 120.  No murmurs rubs or gallops.  Extremities warm and  well-perfused without mottling  Pulmonary:      Effort: Pulmonary effort is normal. No respiratory distress.      Breath sounds: Normal breath sounds. No wheezing.      Comments: No increased work of breathing.  Speaking in complete sentences.  Lungs clear to auscultation bilaterally without wheezes, rales, rhonchi.  Satting 95% on room air indicating adequate oxygenation  Chest:      Chest wall: No tenderness.   Abdominal:      General: Bowel sounds are normal. There is no distension.      Palpations: Abdomen is soft. There is no mass.      Tenderness: There is no abdominal tenderness. There is no guarding or rebound.      Comments: Abdomen soft, nontender, nondistended without rigidity, rebound, guarding   Musculoskeletal:         General: No deformity.   Skin:     General: Skin is warm and dry.      Capillary Refill: Capillary refill takes less than 2 seconds.   Neurological:      Mental Status: He is alert and oriented to person, place, and time.      Comments: Awake, alert, fully oriented.  Somewhat pressured, stable delusions regarding someone tampering with his gas line   Psychiatric:         Behavior: Behavior normal.         Vital Signs  ED Triage Vitals   Temperature Pulse Respirations Blood Pressure SpO2   03/18/24 1229 03/18/24 1227 03/18/24 1227 03/18/24 1227 03/18/24 1227   98.6 °F (37 °C) (!) 127 20 132/78 95 %      Temp Source Heart Rate Source Patient Position - Orthostatic VS BP Location FiO2 (%)   03/18/24 1229 03/18/24 1227 03/18/24 1227 03/18/24 1227 --   Temporal Monitor Sitting Right arm       Pain Score       --                  Vitals:    03/18/24 1227   BP: 132/78   Pulse: (!) 127   Patient Position - Orthostatic VS: Sitting         Visual Acuity      ED Medications  Medications   sodium chloride 0.9 % bolus 1,000 mL (0 mL Intravenous Stopped 3/18/24 1747)   sodium chloride 0.9 % bolus 1,000 mL (0 mL Intravenous Stopped 3/18/24 1747)       Diagnostic Studies  Results Reviewed        Procedure Component Value Units Date/Time    CK [[id number]]  (Abnormal) Collected: 03/18/24 1228    Lab Status: Final result Specimen: Blood from Arm, Left Updated: 03/18/24 1332     Total CK 1,353 U/L     Comprehensive metabolic panel [[id number]]  (Abnormal) Collected: 03/18/24 1228    Lab Status: Final result Specimen: Blood from Arm, Left Updated: 03/18/24 1250     Sodium 135 mmol/L      Potassium 4.1 mmol/L      Chloride 102 mmol/L      CO2 17 mmol/L      ANION GAP 16 mmol/L      BUN 33 mg/dL      Creatinine 2.78 mg/dL      Glucose 72 mg/dL      Calcium 10.0 mg/dL      AST 41 U/L      ALT 26 U/L      Alkaline Phosphatase 69 U/L      Total Protein 7.5 g/dL      Albumin 4.5 g/dL      Total Bilirubin 1.11 mg/dL      eGFR 25 ml/min/1.73sq m     Narrative:      National Kid[employer] Foundation guidelines for Chronic Kidney Disease (CKD):     Stage 1 with normal or high GFR (GFR > 90 mL/min/1.73 square meters)    Stage 2 Mild CKD (GFR = 60-89 mL/min/1.73 square meters)    Stage 3A Moderate CKD (GFR = 45-59 mL/min/1.73 square meters)    Stage 3B Moderate CKD (GFR = 30-44 mL/min/1.73 square meters)    Stage 4 Severe CKD (GFR = 15-29 mL/min/1.73 square meters)    Stage 5 End Stage CKD (GFR <15 mL/min/1.73 square meters)  Note: GFR calculation is accurate only with a steady state creatinine    Ethanol [[id number]]  (Normal) Collected: 03/18/24 1228    Lab Status: Final result Specimen: Blood from Arm, Left Updated: 03/18/24 1249     Ethanol Lvl <10 mg/dL     CBC and differential [[id number]]  (Abnormal) Collected: 03/18/24 1228    Lab Status: Final result Specimen: Blood from Arm, Left Updated: 03/18/24 1232     WBC 16.46 Thousand/uL      RBC 5.05 Million/uL      Hemoglobin 15.8 g/dL      Hematocrit 45.2 %      MCV 90 fL      MCH 31.3 pg      MCHC 35.0 g/dL      RDW 13.0 %      MPV 10.6 fL      Platelets 357 Thousands/uL      nRBC 0 /100 WBCs      Neutrophils Relative 75 %      Immature Grans % 0 %      Lymphocytes  Relative 14 %      Monocytes Relative 10 %      Eosinophils Relative 0 %      Basophils Relative 1 %      Neutrophils Absolute 12.43 Thousands/µL      Absolute Immature Grans 0.05 Thousand/uL      Absolute Lymphocytes 2.27 Thousands/µL      Absolute Monocytes 1.60 Thousand/µL      Eosinophils Absolute 0.01 Thousand/µL      Basophils Absolute 0.10 Thousands/µL     UA (URINE) with reflex to Scope [[id number]]     Lab Status: No result Specimen: Urine     Rapid drug screen, urine [[id number]]     Lab Status: No result Specimen: Urine                    No orders to display              Procedures  Procedures         ED Course                               SBIRT 20yo+      Flowsheet Row Most Recent Value   Initial Alcohol Screen: US AUDIT-C     1. How often do you have a drink containing alcohol? 0 Filed at: 03/18/2024 1216   2. How many drinks containing alcohol do you have on a typical day you are drinking?  0 Filed at: 03/18/2024 1216   3a. Male UNDER 65: How often do you have five or more drinks on one occasion? 0 Filed at: 03/18/2024 1216   3b. FEMALE Any Age, or MALE 65+: How often do you have 4 or more drinks on one occassion? 0 Filed at: 03/18/2024 1216   Audit-C Score 0 Filed at: 03/18/2024 1216   CARLA: How many times in the past year have you...    Used an illegal drug or used a prescription medication for non-medical reasons? Never Filed at: 03/18/2024 1216                      Medical Decision Making  I obtained history from the patient and from the police.  I reviewed external medical documentation.  Patient with several psychiatric evaluations in the emergency department over the course of the past year, agitated at those times with components of psychosis.  In prior instances, patient's symptoms were in the setting of methamphetamine abuse and patient was ultimately discharged home without psychiatric admission.  Patient with presentation similar to prior presentations and does admit to methamphetamine  abuse at some point in the last few days.  I ordered and reviewed lab work including CBC, CMP, ethanol, rapid drug screen, urinalysis.  I discussed patient with the ED crisis worker.  Patient's symptoms all appear to be secondary to methamphetamine abuse rather than a psychiatric condition.Patient noted to have a creatinine of 2.78 compared to 2.09 on 2/9/2024.  I ordered a creatinine kinase which is moderately elevated.  Discussed medical admission for IV fluids, trending of creatinine but patient refusing this.  Despite patient's methamphetamine use, patient fully oriented and understanding the risk of leaving AGAINST MEDICAL ADVICE, specifically the risk of permanent kidney damage.  Patient agreeable to receiving 2 L of fluid prior to going home and states that he will orally hydrate after discharge.  Patient once again offered drug counseling and continues to refuse.  Patient left AGAINST MEDICAL ADVICE following IV hydration.    Amount and/or Complexity of Data Reviewed  Labs: ordered.             Disposition  Final diagnoses:   ROB (acute kidney injury) (HCC)   Methamphetamine abuse (HCC)     Time reflects when diagnosis was documented in both MDM as applicable and the Disposition within this note       Time User Action Codes Description Comment    3/18/2024  4:12 PM Humphrey Leon Add [N17.9] ROB (acute kidney injury) (HCC)     3/18/2024  4:13 PM Humphrey Leon Add [F15.10] Methamphetamine abuse (HCC)           ED Disposition       ED Disposition   AMA    Condition   --    Date/Time   Mon Mar 18, 2024 1613    Comment   Date: 3/18/2024  Patient: Milan Baig  Admitted: 3/18/2024 12:15 PM  Attending Provider: Humphrey Leon MD    Milan Morrowmarzenajordan or his authorized caregiver has made the decision for the patient to leave the emergency department  against the advice of his attending physician. He or his authorized caregiver has been informed and understands the inherent risks,  including death, permanent disability, permanent loss of renal function potentially resulting in dialysis.  He or his  authorized caregiver has decided to accept the responsibility for this decision. Milan Baig and all necessary parties have been advised that he may return for further evaluation or treatment. His condition at time of discharge was stable.   Milan Baig had current vital signs as follows:  /78 (BP Location: Right arm)   Pulse (!) 127   Temp 98.6 °F (37 °C) (Temporal)   Resp 20                Follow-up Information       Follow up With Specialties Details Why Contact Info Additional Information    [facility] Emergency Department Emergency Medicine  If symptoms worsen [address] [facility] [address]  [phone] [facility] Emergency Department, [address] [place], [place], [address]            Discharge Medication List as of 3/18/2024  4:15 PM        CONTINUE these medications which have NOT CHANGED    Details   ergocalciferol (VITAMIN D2) 50,000 units Take 1 capsule (50,000 Units total) by mouth once a week, Starting Wed 9/28/2022, Normal      nicotine polacrilex (COMMIT) 4 MG lozenge Apply 1 lozenge (4 mg total) to the mouth or throat as needed for smoking cessation, Starting Tue 3/28/2023, Normal      Ponesimod (Ponvory) 20 MG TABS Take 20 mg by mouth in the morning Start after completion of starter pack., Starting Mon 5/22/2023, Normal      Ponesimod Starter Pack (Ponvory Starter Pack) 5-8-5-5-6-7-8-9 & 10 MG TBPK Take 2 mg by mouth in the morning Please follow instructions of starter pack. Day 15th is when you take 20mg a day., Starting Mon 5/22/2023, Normal      Vraylar 3 MG capsule Take 6 mg by mouth daily, Starting Mon 11/15/2021, Historical Med             No discharge procedures on file.    PDMP Review         Value Time User    PDMP Reviewed  Yes 10/26/2022  1:38 PM Yobani Lim MD             ED Provider  Electronically Signed by             Humphrey Leon MD  03/18/24 0954

## 2024-03-18 NOTE — ED NOTES
3/18/24:  Patient will be discharged after received fluids to address his medical issue.  MS Tammy

## 2024-09-27 ENCOUNTER — HOSPITAL ENCOUNTER (EMERGENCY)
Facility: HOSPITAL | Age: 49
Discharge: HOME | End: 2024-09-28
Attending: EMERGENCY MEDICINE
Payer: MEDICARE

## 2024-09-27 DIAGNOSIS — Z00.8 ENCOUNTER FOR PSYCHOLOGICAL EVALUATION: Primary | ICD-10-CM

## 2024-09-27 DIAGNOSIS — F20.9 SCHIZOPHRENIA (HCC): ICD-10-CM

## 2024-09-27 DIAGNOSIS — F15.10 METHAMPHETAMINE USE (HCC): ICD-10-CM

## 2024-09-27 LAB
ALBUMIN SERPL BCG-MCNC: 4.4 G/DL (ref 3.5–5)
ALP SERPL-CCNC: 77 U/L (ref 34–104)
ALT SERPL W P-5'-P-CCNC: 23 U/L (ref 7–52)
ANION GAP SERPL CALCULATED.3IONS-SCNC: 14 MMOL/L (ref 4–13)
AST SERPL W P-5'-P-CCNC: 35 U/L (ref 13–39)
BASOPHILS # BLD AUTO: 0.11 THOUSANDS/ΜL (ref 0–0.1)
BASOPHILS NFR BLD AUTO: 1 % (ref 0–1)
BILIRUB SERPL-MCNC: 0.93 MG/DL (ref 0.2–1)
BUN SERPL-MCNC: 32 MG/DL (ref 5–25)
CALCIUM SERPL-MCNC: 9.2 MG/DL (ref 8.4–10.2)
CHLORIDE SERPL-SCNC: 106 MMOL/L (ref 96–108)
CK SERPL-CCNC: 839 U/L (ref 39–308)
CO2 SERPL-SCNC: 18 MMOL/L (ref 21–32)
CREAT SERPL-MCNC: 1.72 MG/DL (ref 0.6–1.3)
EOSINOPHIL # BLD AUTO: 0.06 THOUSAND/ΜL (ref 0–0.61)
EOSINOPHIL NFR BLD AUTO: 1 % (ref 0–6)
ERYTHROCYTE [DISTWIDTH] IN BLOOD BY AUTOMATED COUNT: 13.2 % (ref 11.6–15.1)
ETHANOL SERPL-MCNC: <10 MG/DL
GFR SERPL CREATININE-BSD FRML MDRD: 45 ML/MIN/1.73SQ M
GLUCOSE SERPL-MCNC: 94 MG/DL (ref 65–140)
HCT VFR BLD AUTO: 47.7 % (ref 36.5–49.3)
HGB BLD-MCNC: 16.3 G/DL (ref 12–17)
IMM GRANULOCYTES # BLD AUTO: 0.02 THOUSAND/UL (ref 0–0.2)
IMM GRANULOCYTES NFR BLD AUTO: 0 % (ref 0–2)
LYMPHOCYTES # BLD AUTO: 1.99 THOUSANDS/ΜL (ref 0.6–4.47)
LYMPHOCYTES NFR BLD AUTO: 18 % (ref 14–44)
MCH RBC QN AUTO: 30.6 PG (ref 26.8–34.3)
MCHC RBC AUTO-ENTMCNC: 34.2 G/DL (ref 31.4–37.4)
MCV RBC AUTO: 90 FL (ref 82–98)
MONOCYTES # BLD AUTO: 1.18 THOUSAND/ΜL (ref 0.17–1.22)
MONOCYTES NFR BLD AUTO: 11 % (ref 4–12)
NEUTROPHILS # BLD AUTO: 7.56 THOUSANDS/ΜL (ref 1.85–7.62)
NEUTS SEG NFR BLD AUTO: 69 % (ref 43–75)
NRBC BLD AUTO-RTO: 0 /100 WBCS
PLATELET # BLD AUTO: 404 THOUSANDS/UL (ref 149–390)
PMV BLD AUTO: 11.1 FL (ref 8.9–12.7)
POTASSIUM SERPL-SCNC: 3.8 MMOL/L (ref 3.5–5.3)
PROT SERPL-MCNC: 7.4 G/DL (ref 6.4–8.4)
RBC # BLD AUTO: 5.33 MILLION/UL (ref 3.88–5.62)
SODIUM SERPL-SCNC: 138 MMOL/L (ref 135–147)
WBC # BLD AUTO: 10.92 THOUSAND/UL (ref 4.31–10.16)

## 2024-09-27 PROCEDURE — 99283 EMERGENCY DEPT VISIT LOW MDM: CPT

## 2024-09-27 PROCEDURE — 36415 COLL VENOUS BLD VENIPUNCTURE: CPT | Performed by: EMERGENCY MEDICINE

## 2024-09-27 PROCEDURE — 85025 COMPLETE CBC W/AUTO DIFF WBC: CPT | Performed by: EMERGENCY MEDICINE

## 2024-09-27 PROCEDURE — 80053 COMPREHEN METABOLIC PANEL: CPT | Performed by: EMERGENCY MEDICINE

## 2024-09-27 PROCEDURE — 96360 HYDRATION IV INFUSION INIT: CPT

## 2024-09-27 PROCEDURE — 82077 ASSAY SPEC XCP UR&BREATH IA: CPT | Performed by: EMERGENCY MEDICINE

## 2024-09-27 PROCEDURE — 82550 ASSAY OF CK (CPK): CPT | Performed by: EMERGENCY MEDICINE

## 2024-09-27 PROCEDURE — 96372 THER/PROPH/DIAG INJ SC/IM: CPT

## 2024-09-27 PROCEDURE — 99285 EMERGENCY DEPT VISIT HI MDM: CPT | Performed by: EMERGENCY MEDICINE

## 2024-09-27 RX ORDER — LORAZEPAM 2 MG/ML
2 INJECTION INTRAMUSCULAR ONCE
Status: COMPLETED | OUTPATIENT
Start: 2024-09-27 | End: 2024-09-27

## 2024-09-27 RX ORDER — HALOPERIDOL 0.5 MG/1
TABLET ORAL DAILY
COMMUNITY

## 2024-09-27 RX ORDER — OLANZAPINE 10 MG/2ML
10 INJECTION, POWDER, FOR SOLUTION INTRAMUSCULAR ONCE
Status: COMPLETED | OUTPATIENT
Start: 2024-09-27 | End: 2024-09-27

## 2024-09-27 RX ADMIN — LORAZEPAM 2 MG: 2 INJECTION INTRAMUSCULAR; INTRAVENOUS at 19:34

## 2024-09-27 RX ADMIN — OLANZAPINE 10 MG: 10 INJECTION, POWDER, FOR SOLUTION INTRAMUSCULAR at 19:34

## 2024-09-27 RX ADMIN — SODIUM CHLORIDE 1000 ML: 0.9 INJECTION, SOLUTION INTRAVENOUS at 20:09

## 2024-09-27 NOTE — ED NOTES
Pt reported to Triage RN he takes haldol and one other med but does not know the name. Pt is unsure how strong the haldol is just he takes one pill once a day.         Cristiana Hamilton RN  09/27/24 0113

## 2024-09-27 NOTE — ED NOTES
Pt changed into paper scrubs and brought back to secured holding area.\     Fabienne Brady RN  09/27/24 [id number]

## 2024-09-27 NOTE — ED NOTES
Per MD patient needs to receive a liter bolus for his kidneys and CK level. Pt given IM medications and brought out to the main area of the ED in order to receive this treatment. Pt initially refused medications stating that he did not want them, patient educated that the purpose was to allow him to be calmer for his move to the medical side of the ED, and that his earlier actions of him pacing and throwing objects around the SH area warranted medication administration. Security called to the  for this interaction. Pt requesting to speak to the MD, MD aware.      Nita Leslie, RENAY  09/27/24 1938

## 2024-09-27 NOTE — ED PROVIDER NOTES
Final diagnoses:   Encounter for psychological evaluation   Methamphetamine use (HCC)   Schizophrenia (HCC)     ED Disposition       None          Assessment & Plan       Medical Decision Making  Prior records reviewed.  Pt. Last seen here 3/18/2024 and diagnosed with rhabdomyolysis and ROB.  He signed out AMA at that time.  Labs reviewed and similar to prior.  Cr. Improved from last time.  CK is mildly elevated.  Discussed case with family guidance who knows pt. Well and feels this is meth related.  Pt. Pacing the [facility] holding area, yelling and agitated.  Will medicate for safety, give IVF and then re-evaluate.  2030 - pt. Sleeping.  2230 - will re-evaluate in am and if needed crisis or [facility] evaluation.  Signed out to night doctor.    Amount and/or Complexity of Data Reviewed  Labs: ordered.    Risk  Prescription drug management.             Medications   OLANZapine (ZyPREXA) IM injection 10 mg (10 mg Intramuscular Given 9/27/24 1934)   LORazepam (ATIVAN) injection 2 mg (2 mg Intramuscular Given 9/27/24 1934)   sodium chloride 0.9 % bolus 1,000 mL (0 mL Intravenous Stopped 9/27/24 2119)       ED Risk Strat Scores                           SBIRT 20yo+      Flowsheet Row Most Recent Value   Initial Alcohol Screen: US AUDIT-C     1. How often do you have a drink containing alcohol? 0 Filed at: 09/27/2024 1759   2. How many drinks containing alcohol do you have on a typical day you are drinking?  0 Filed at: 09/27/2024 1759   3a. Male UNDER 65: How often do you have five or more drinks on one occasion? 0 Filed at: 09/27/2024 1759   3b. FEMALE Any Age, or MALE 65+: How often do you have 4 or more drinks on one occassion? 0 Filed at: 09/27/2024 1759   Audit-C Score 0 Filed at: 09/27/2024 1759   CARLA: How many times in the past year have you...    Used an illegal drug or used a prescription medication for non-medical reasons? Never Filed at: 09/27/2024 1759                            History of Present Illness       Chief  Complaint   Patient presents with    Psychiatric Evaluation     Pt brought in by EMS with police. Found searching around his car for an animal. No animal found. Pt known psych history. Reported pt has not been taking his medications.        Past Medical History:   Diagnosis Date    Multiple sclerosis (HCC)     Psychiatric disorder       Past Surgical History:   Procedure Laterality Date    HAND SURGERY        Family History   Problem Relation Age of Onset    Diabetes Mother     Crohn's disease Father       Social History     Tobacco Use    Smoking status: Every Day     Current packs/day: 0.50     Types: Cigarettes    Smokeless tobacco: Current   Vaping Use    Vaping status: Never Used   Substance Use Topics    Alcohol use: No    Drug use: Not Currently     Comment: sts he quit drugs      E-Cigarette/Vaping    E-Cigarette Use Never User       E-Cigarette/Vaping Substances    Nicotine No     THC No     CBD No     Other No     Unknown No       I have reviewed and agree with the history as documented.     48 yo male with history of MS (inactive he says) and schizophrenia brought in by the police.  Pt. Says he was under his car looking for a large lizard that he saw.  He says he realizes that sounds crazy.  He has not taken his meds for the past 2 days because he says they have bad side effects so sometimes he doesn't take them.  He denies hearing voices.  Denies SI.  No recent illness or injury.        History provided by:  Police and patient   used: No    Psychiatric Evaluation  Presenting symptoms: hallucinations    Presenting symptoms: no self-mutilation and no suicidal thoughts    Associated symptoms: no chest pain and no headaches        Review of Systems   Constitutional:  Negative for fever.   Respiratory:  Negative for cough.    Cardiovascular:  Negative for chest pain.   Gastrointestinal:  Negative for diarrhea and vomiting.   Musculoskeletal:  Negative for back pain.   Skin:  Negative for  rash and wound.   Neurological:  Negative for headaches.   Psychiatric/Behavioral:  Positive for hallucinations. Negative for self-injury and suicidal ideas.    All other systems reviewed and are negative.          Objective       ED Triage Vitals [09/27/24 1800]   Temperature Pulse Blood Pressure Respirations SpO2 Patient Position - Orthostatic VS   97.5 °F (36.4 °C) (!) 122 136/84 18 95 % --      Temp src Heart Rate Source BP Location FiO2 (%) Pain Score    -- -- -- -- No Pain      Vitals      Date and Time Temp Pulse SpO2 Resp BP Pain Score FACES Pain Rating User   09/27/24 1800 97.5 °F (36.4 °C) 122 95 % 18 136/84 No Pain -- SHIRA            Physical Exam  Vitals and nursing note reviewed.   Constitutional:       General: He is not in acute distress.     Appearance: He is well-developed. He is not ill-appearing or diaphoretic.   HENT:      Head: Normocephalic and atraumatic.   Eyes:      General: No scleral icterus.     Conjunctiva/sclera: Conjunctivae normal.   Cardiovascular:      Rate and Rhythm: Normal rate and regular rhythm.      Heart sounds: Normal heart sounds. No murmur heard.  Pulmonary:      Effort: Pulmonary effort is normal. No respiratory distress.      Breath sounds: Normal breath sounds.   Abdominal:      General: Bowel sounds are normal. There is no distension.      Palpations: Abdomen is soft.      Tenderness: There is no abdominal tenderness.   Musculoskeletal:         General: No deformity. Normal range of motion.      Cervical back: Normal range of motion and neck supple.      Right lower leg: No edema.      Left lower leg: No edema.   Skin:     General: Skin is warm and dry.      Coloration: Skin is not pale.      Findings: No erythema or rash.   Neurological:      General: No focal deficit present.      Mental Status: He is alert and oriented to person, place, and time.      Cranial Nerves: No cranial nerve deficit.      Comments: Pt. In constant motion like choreiform movements    Psychiatric:         Mood and Affect: Mood normal.         Behavior: Behavior normal.      Comments: Pt. Is cooperative         Results Reviewed       Procedure Component Value Units Date/Time    Ethanol [[id number]]  (Normal) Collected: 09/27/24 1811    Lab Status: Final result Specimen: Blood from Arm, Right Updated: 09/27/24 1914     Ethanol Lvl <10 mg/dL     Comprehensive metabolic panel [[id number]]  (Abnormal) Collected: 09/27/24 1811    Lab Status: Final result Specimen: Blood from Arm, Right Updated: 09/27/24 1914     Sodium 138 mmol/L      Potassium 3.8 mmol/L      Chloride 106 mmol/L      CO2 18 mmol/L      ANION GAP 14 mmol/L      BUN 32 mg/dL      Creatinine 1.72 mg/dL      Glucose 94 mg/dL      Calcium 9.2 mg/dL      AST 35 U/L      ALT 23 U/L      Alkaline Phosphatase 77 U/L      Total Protein 7.4 g/dL      Albumin 4.4 g/dL      Total Bilirubin 0.93 mg/dL      eGFR 45 ml/min/1.73sq m     Narrative:      National Kidney Disease Foundation guidelines for Chronic Kidney Disease (CKD):     Stage 1 with normal or high GFR (GFR > 90 mL/min/1.73 square meters)    Stage 2 Mild CKD (GFR = 60-89 mL/min/1.73 square meters)    Stage 3A Moderate CKD (GFR = 45-59 mL/min/1.73 square meters)    Stage 3B Moderate CKD (GFR = 30-44 mL/min/1.73 square meters)    Stage 4 Severe CKD (GFR = 15-29 mL/min/1.73 square meters)    Stage 5 End Stage CKD (GFR <15 mL/min/1.73 square meters)  Note: GFR calculation is accurate only with a steady state creatinine    CK [[id number]]  (Abnormal) Collected: 09/27/24 1811    Lab Status: Final result Specimen: Blood from Arm, Right Updated: 09/27/24 1914     Total  U/L     CBC and differential [[id number]]  (Abnormal) Collected: 09/27/24 1811    Lab Status: Final result Specimen: Blood from Arm, Right Updated: 09/27/24 1838     WBC 10.92 Thousand/uL      RBC 5.33 Million/uL      Hemoglobin 16.3 g/dL      Hematocrit 47.7 %      MCV 90 fL      MCH 30.6 pg      MCHC 34.2 g/dL      RDW 13.2  %      MPV 11.1 fL      Platelets 404 Thousands/uL      nRBC 0 /100 WBCs      Segmented % 69 %      Immature Grans % 0 %      Lymphocytes % 18 %      Monocytes % 11 %      Eosinophils Relative 1 %      Basophils Relative 1 %      Absolute Neutrophils 7.56 Thousands/µL      Absolute Immature Grans 0.02 Thousand/uL      Absolute Lymphocytes 1.99 Thousands/µL      Absolute Monocytes 1.18 Thousand/µL      Eosinophils Absolute 0.06 Thousand/µL      Basophils Absolute 0.11 Thousands/µL     Rapid drug screen, urine [[id number]]     Lab Status: No result Specimen: Urine     UA (URINE) with reflex to Scope [[id number]]     Lab Status: No result Specimen: Urine             No orders to display       Procedures    ED Medication and Procedure Management   Prior to Admission Medications   Prescriptions Last Dose Informant Patient Reported? Taking?   Ponesimod (Ponvory) 20 MG TABS Unknown  No No   Sig: Take 20 mg by mouth in the morning Start after completion of starter pack.   Patient not taking: Reported on 9/27/2024   Ponesimod Starter Pack (Ponvory Starter Pack) 4-8-3-5-6-7-8-9 & 10 MG TBPK   No No   Sig: Take 2 mg by mouth in the morning Please follow instructions of starter pack. Day 15th is when you take 20mg a day.   Vraylar 3 MG capsule Not Taking  Yes No   Sig: Take 6 mg by mouth daily   Patient not taking: Reported on 9/27/2024   ergocalciferol (VITAMIN D2) 50,000 units Not Taking  No No   Sig: Take 1 capsule (50,000 Units total) by mouth once a week   Patient not taking: Reported on 5/18/2023   haloperidol (HALDOL) 0.5 mg tablet   Yes Yes   Sig: Take by mouth in the morning Pt reported taking one pill   nicotine polacrilex (COMMIT) 4 MG lozenge Past Week  No Yes   Sig: Apply 1 lozenge (4 mg total) to the mouth or throat as needed for smoking cessation      Facility-Administered Medications: None     Patient's Medications   Discharge Prescriptions    No medications on file     No discharge procedures on file.  ED SEPSIS  DOCUMENTATION   Time reflects when diagnosis was documented in both MDM as applicable and the Disposition within this note       Time User Action Codes Description Comment    9/27/2024  8:35 PM Belinda Estrada [Z00.8] Encounter for psychological evaluation     9/27/2024  8:36 PM Belinda Esrtada [F15.10] Methamphetamine use (HCC)     9/27/2024  8:36 PM Belinda Estrada [F20.9] Schizophrenia ([facility])                  Belinda Estrada MD  09/27/24 7127

## 2024-09-27 NOTE — ED NOTES
Patient was found to be pacing common area of secured holding responding to internal stimuli, yelling about a landlord and a lizard in his car. Security at bedside to attempt to redirect patient. Med orders in from provider. This RN, along with charge RN, 2 other Rns, ED tech, and Security entered  area to medicate pt. Pt cooperative to go to room and sit, but argumentative about receiving two injections vs one. Charge RN attempted to provide education, which pt refused. Security and Rns assisted pt into a safe position to receive medications. Pt then walked out of  to  2 in order to get IV access and administer fluids, per providers order. Pt currently refusing IV and fluids until he can speak to the Provider.     Patient also requested that Binh from Security remain at his bedside for time being, to which security agreed.     Jodi Ecsobar RN  09/27/24 7443

## 2024-09-28 VITALS
TEMPERATURE: 96.9 F | DIASTOLIC BLOOD PRESSURE: 73 MMHG | SYSTOLIC BLOOD PRESSURE: 111 MMHG | RESPIRATION RATE: 18 BRPM | HEIGHT: 74 IN | OXYGEN SATURATION: 97 % | BODY MASS INDEX: 30.16 KG/M2 | WEIGHT: 235 LBS | HEART RATE: 97 BPM

## 2024-09-28 NOTE — ED NOTES
Patient has been sleeping all morning.  PES discussed with Sarai of [facility].  She advised to allow the patient to sleep before meeting with him.  Per Sarai, patient is well know by [facility].  She reported that the patient abuses meth and is calm and cooperative when the drug wears off.

## 2024-09-28 NOTE — ED NOTES
"Patient noted by sitter to get out of bed and lean against wall of rm2, in a position to urinate. This RN and ED tech attempted to direct pt to use a urinal, which was refused. Pt urinated in pants and on floor. Leonardo NIÑO attempted to offer dry paper scrub pants and to assist in wiping pt up, which again was refused. Pt began to get aggressive and security came to bedside. IV removed per provider order and pt placed in wheelchair after much encouragement to sit in wheelchair, and taken back to rm20 in secured holding area. Pt offered warm blankets and responded by saying \"Fuck you\" to all present.     Pt offers no other c/o at this time. 1:1 continual observation via secured holding in place at this time.      Jodi Escobar RN  09/27/24 6227    "

## 2024-09-28 NOTE — ED CARE HANDOFF
Emergency Department Sign Out Note        Sign out and transfer of care from Dr Denis. See Separate Emergency Department note.     The patient, Milan Baig, was evaluated by the previous provider for behavioral evaluation.     Workup Completed:  Crisis labs    ED Course / Workup Pending (followup):  Pending re-eval by FG once more awake                                  ED Course as of 09/28/24 1518   Sat Sep 28, 2024   0746 FG re-eval     Procedures  Medical Decision Making  Amount and/or Complexity of Data Reviewed  Labs: ordered.    Risk  Prescription drug management.            Disposition  Final diagnoses:   Encounter for psychological evaluation   Methamphetamine use (HCC)   Schizophrenia (HCC)     Time reflects when diagnosis was documented in both MDM as applicable and the Disposition within this note       Time User Action Codes Description Comment    9/27/2024  8:35 PM Belinda Estrada Add [Z00.8] Encounter for psychological evaluation     9/27/2024  8:36 PM Belinda Estrada Add [F15.10] Methamphetamine use (HCC)     9/27/2024  8:36 PM Belinda Estrada Add [F20.9] Schizophrenia (HCC)           ED Disposition       None          Follow-up Information    None       Patient's Medications   Discharge Prescriptions    No medications on file     No discharge procedures on file.       ED Provider  Electronically Signed by     Rafal Simmons DO  09/28/24 1518

## 2024-09-28 NOTE — DISCHARGE INSTRUCTIONS
Follow-up with your family doctor.  Return to the emergency room for any new or concerning symptoms.

## 2024-09-28 NOTE — ED NOTES
Patient discharged per DO and PES. Patient belongings returned. Patient changing in the bathroom.   [employer] ordered for patient. Patient to be escorted to waiting room by security Dara Jean RN  09/28/24 [id number]

## 2024-09-28 NOTE — ED NOTES
Attempted to wake up patient for vital signs. Patient provided his arm but did not wake up. Patient rolled back under blanket after vital signs were taken. Patient did not stay awake despite attempting to interact with patient.          Dara Jean RN  09/28/24 0464     - postop course c/b drop in hemoglobin, has been transfused with 2uPRBC thus far (last on 7/29)  - continue to trend daily  - AC when Hgb stable and chest tube out No - overall improving, continue to trend daily   - of note donor blood culture positive for gram positive streptococcus sp. and respiratory culture positive for haemophilus influenzae. ID following and appreciate recommendations.  - will need to determine duration of abx  - currently on Cefepime IV and Tobramycin inhaled

## 2024-09-29 ENCOUNTER — TELEPHONE (OUTPATIENT)
Age: 49
End: 2024-09-29

## 2024-09-29 NOTE — TELEPHONE ENCOUNTER
Contacted Patient regarding recent ED Visit dated 9/27/24.     Advised patient to contact the office with new or worsen symptoms as we have On Call Provider 24 hrs. Provided office hours and phone. No further action needed at this time.        ED:[facility]  CC:Psychiatric Evaluation  DX:Psychological Evaluation  Time:7:34pm   Last OV:8/22/22     No outreach done due to Visit Type.

## 2024-11-30 ENCOUNTER — HOSPITAL ENCOUNTER (EMERGENCY)
Facility: HOSPITAL | Age: 49
Discharge: HOME | End: 2024-11-30
Attending: EMERGENCY MEDICINE | Admitting: EMERGENCY MEDICINE
Payer: MEDICARE

## 2024-11-30 VITALS
TEMPERATURE: 97.5 F | DIASTOLIC BLOOD PRESSURE: 93 MMHG | RESPIRATION RATE: 20 BRPM | OXYGEN SATURATION: 98 % | SYSTOLIC BLOOD PRESSURE: 133 MMHG | HEART RATE: 117 BPM

## 2024-11-30 DIAGNOSIS — Z00.8 ENCOUNTER FOR PSYCHOLOGICAL EVALUATION: Primary | ICD-10-CM

## 2024-11-30 LAB
ALBUMIN SERPL BCG-MCNC: 4.7 G/DL (ref 3.5–5)
ALP SERPL-CCNC: 83 U/L (ref 34–104)
ALT SERPL W P-5'-P-CCNC: 17 U/L (ref 7–52)
ANION GAP SERPL CALCULATED.3IONS-SCNC: 11 MMOL/L (ref 4–13)
AST SERPL W P-5'-P-CCNC: 16 U/L (ref 13–39)
BASOPHILS # BLD AUTO: 0.12 THOUSANDS/ΜL (ref 0–0.1)
BASOPHILS NFR BLD AUTO: 1 % (ref 0–1)
BILIRUB SERPL-MCNC: 0.68 MG/DL (ref 0.2–1)
BUN SERPL-MCNC: 28 MG/DL (ref 5–25)
CALCIUM SERPL-MCNC: 9.4 MG/DL (ref 8.4–10.2)
CHLORIDE SERPL-SCNC: 104 MMOL/L (ref 96–108)
CO2 SERPL-SCNC: 23 MMOL/L (ref 21–32)
CREAT SERPL-MCNC: 2.3 MG/DL (ref 0.6–1.3)
EOSINOPHIL # BLD AUTO: 0.04 THOUSAND/ΜL (ref 0–0.61)
EOSINOPHIL NFR BLD AUTO: 0 % (ref 0–6)
ERYTHROCYTE [DISTWIDTH] IN BLOOD BY AUTOMATED COUNT: 12.8 % (ref 11.6–15.1)
ETHANOL SERPL-MCNC: <10 MG/DL
GFR SERPL CREATININE-BSD FRML MDRD: 31 ML/MIN/1.73SQ M
GLUCOSE SERPL-MCNC: 96 MG/DL (ref 65–140)
HCT VFR BLD AUTO: 51.6 % (ref 36.5–49.3)
HGB BLD-MCNC: 17.6 G/DL (ref 12–17)
IMM GRANULOCYTES # BLD AUTO: 0.07 THOUSAND/UL (ref 0–0.2)
IMM GRANULOCYTES NFR BLD AUTO: 1 % (ref 0–2)
LYMPHOCYTES # BLD AUTO: 2.62 THOUSANDS/ΜL (ref 0.6–4.47)
LYMPHOCYTES NFR BLD AUTO: 17 % (ref 14–44)
MCH RBC QN AUTO: 31 PG (ref 26.8–34.3)
MCHC RBC AUTO-ENTMCNC: 34.1 G/DL (ref 31.4–37.4)
MCV RBC AUTO: 91 FL (ref 82–98)
MONOCYTES # BLD AUTO: 1.48 THOUSAND/ΜL (ref 0.17–1.22)
MONOCYTES NFR BLD AUTO: 10 % (ref 4–12)
NEUTROPHILS # BLD AUTO: 10.7 THOUSANDS/ΜL (ref 1.85–7.62)
NEUTS SEG NFR BLD AUTO: 71 % (ref 43–75)
NRBC BLD AUTO-RTO: 0 /100 WBCS
PLATELET # BLD AUTO: 386 THOUSANDS/UL (ref 149–390)
PMV BLD AUTO: 10.4 FL (ref 8.9–12.7)
POTASSIUM SERPL-SCNC: 4.8 MMOL/L (ref 3.5–5.3)
PROT SERPL-MCNC: 7.6 G/DL (ref 6.4–8.4)
RBC # BLD AUTO: 5.68 MILLION/UL (ref 3.88–5.62)
SODIUM SERPL-SCNC: 138 MMOL/L (ref 135–147)
WBC # BLD AUTO: 15.03 THOUSAND/UL (ref 4.31–10.16)

## 2024-11-30 PROCEDURE — 99284 EMERGENCY DEPT VISIT MOD MDM: CPT | Performed by: EMERGENCY MEDICINE

## 2024-11-30 PROCEDURE — 80053 COMPREHEN METABOLIC PANEL: CPT | Performed by: EMERGENCY MEDICINE

## 2024-11-30 PROCEDURE — 82077 ASSAY SPEC XCP UR&BREATH IA: CPT | Performed by: EMERGENCY MEDICINE

## 2024-11-30 PROCEDURE — 85025 COMPLETE CBC W/AUTO DIFF WBC: CPT | Performed by: EMERGENCY MEDICINE

## 2024-11-30 PROCEDURE — 99283 EMERGENCY DEPT VISIT LOW MDM: CPT

## 2024-11-30 PROCEDURE — 36415 COLL VENOUS BLD VENIPUNCTURE: CPT | Performed by: EMERGENCY MEDICINE

## 2024-11-30 NOTE — ED NOTES
Patient brought to the ED by police after he called 911 because he thought he heard neighbors arguing and gunshots. The police confirmed that there was no dispute with neighbors or gunshots.  Patient does not appear intoxicated at this time as he is a regular meth user. He is displaying symptoms of meth a day or 2 after use. He is paranoid and says he hears people talking and feels that they are listening in and he just wants to sleep so they go away.  He was unable to stop pacing during assessment and had rapid pressured speech. He denies any suicidal/homicidal ideations or visual hallucinations. Patient reported that he hears voices at times and did not want to discuss drug use at all.  Patient reported that he has not taken meds in 2 days and is not sure he will continue them at all. He sees a psychiatrist at [facility] and said he would see them next week to discuss med changes or coming off meds before he totally stops taking them.  Patient is not interested in inpatien psychiatric treatment or drug treatment and said again that he wanted to go home. There is no criteria to hold patient to be screened by [facility] for commitment so doctor discharged patient as he requested.  [facility] did speak with Vi at [facility] and they have not had any recent contact with patient.

## 2024-11-30 NOTE — ED PROVIDER NOTES
Time reflects when diagnosis was documented in both MDM as applicable and the Disposition within this note       Time User Action Codes Description Comment    11/30/2024  8:13 AM Goldy Estrella Add [Z00.8] Encounter for psychological evaluation           ED Disposition       ED Disposition   Discharge    Condition   Stable    Date/Time   Sat Nov 30, 2024  8:58 AM    Comment   Milan Baig should be transferred out to [facility] and has been medically cleared.               Assessment & Plan       Medical Decision Making  Pulse ox 98% on room air indicating adequate oxygenation.    Patient medically clear for mental health evaluation patient treatment as needed.    Patient evaluated by crisis cleared for discharge.  Patient did not want to talk about any drug abuse or any drug counseling.  At this time there is no evidence that is just patient is a threat to himself or anybody else.    Amount and/or Complexity of Data Reviewed  Labs: ordered.             Medications - No data to display    ED Risk Strat Scores                           SBIRT 22yo+      Flowsheet Row Most Recent Value   Initial Alcohol Screen: US AUDIT-C     1. How often do you have a drink containing alcohol? 0 Filed at: 11/30/2024 0800   2. How many drinks containing alcohol do you have on a typical day you are drinking?  0 Filed at: 11/30/2024 0800   3a. Male UNDER 65: How often do you have five or more drinks on one occasion? 0 Filed at: 11/30/2024 0800   Audit-C Score 0 Filed at: 11/30/2024 0800                            History of Present Illness       Chief Complaint   Patient presents with    Psychiatric Evaluation     Pt arrives with [place] [employer] after hearing gunshots and neighbors fighting, but per police, when they arrived none of that had appeared to have happened.        Past Medical History:   Diagnosis Date    Multiple sclerosis (HCC)     Psychiatric disorder       Past Surgical History:   Procedure Laterality Date    HAND  SURGERY        Family History   Problem Relation Age of Onset    Diabetes Mother     Crohn's disease Father       Social History     Tobacco Use    Smoking status: Every Day     Current packs/day: 0.50     Types: Cigarettes    Smokeless tobacco: Current   Vaping Use    Vaping status: Never Used   Substance Use Topics    Alcohol use: No    Drug use: Not Currently     Comment: sts he quit drugs      E-Cigarette/Vaping    E-Cigarette Use Never User       E-Cigarette/Vaping Substances    Nicotine No     THC No     CBD No     Other No     Unknown No       I have reviewed and agree with the history as documented.     Patient brought in by police for evaluation of mental health.  Patient called 911 after he was hearing gunshots and neighbors fighting.  As per police none of this apparently happened and they believe the patient is hallucinating.  Patient has a history of methamphetamine abuse as well as mental health issues.  Patient denies any suicidal homicidal ideations.  There is no threats to harm himself or harm others made.  Patient states he is supposed to be on medication such as Haldol but is not taking in a couple days.  Offered medication here but patient declined.      History provided by:  Patient and police   used: No    Psychiatric Evaluation      Review of Systems   All other systems reviewed and are negative.          Objective       ED Triage Vitals [11/30/24 0858]   Temperature Pulse Blood Pressure Respirations SpO2 Patient Position - Orthostatic VS   97.5 °F (36.4 °C) (!) 117 133/93 20 98 % --      Temp Source Heart Rate Source BP Location FiO2 (%) Pain Score    Temporal Monitor -- -- --      Vitals      Date and Time Temp Pulse SpO2 Resp BP Pain Score FACES Pain Rating User   11/30/24 0858 97.5 °F (36.4 °C) 117 98 % 20 133/93 -- -- MDD            Physical Exam  Vitals and nursing note reviewed.   Constitutional:       General: He is not in acute distress.  Cardiovascular:      Rate  and Rhythm: Normal rate and regular rhythm.   Pulmonary:      Effort: Pulmonary effort is normal. No respiratory distress.      Breath sounds: Normal breath sounds.   Neurological:      General: No focal deficit present.      Mental Status: He is alert and oriented to person, place, and time.         Results Reviewed       Procedure Component Value Units Date/Time    Comprehensive metabolic panel [[id number]]  (Abnormal) Collected: 11/30/24 0820    Lab Status: Final result Specimen: Blood from Arm, Right Updated: 11/30/24 0846     Sodium 138 mmol/L      Potassium 4.8 mmol/L      Chloride 104 mmol/L      CO2 23 mmol/L      ANION GAP 11 mmol/L      BUN 28 mg/dL      Creatinine 2.30 mg/dL      Glucose 96 mg/dL      Calcium 9.4 mg/dL      AST 16 U/L      ALT 17 U/L      Alkaline Phosphatase 83 U/L      Total Protein 7.6 g/dL      Albumin 4.7 g/dL      Total Bilirubin 0.68 mg/dL      eGFR 31 ml/min/1.73sq m     Narrative:      National Kidney Disease Foundation guidelines for Chronic Kidney Disease (CKD):     Stage 1 with normal or high GFR (GFR > 90 mL/min/1.73 square meters)    Stage 2 Mild CKD (GFR = 60-89 mL/min/1.73 square meters)    Stage 3A Moderate CKD (GFR = 45-59 mL/min/1.73 square meters)    Stage 3B Moderate CKD (GFR = 30-44 mL/min/1.73 square meters)    Stage 4 Severe CKD (GFR = 15-29 mL/min/1.73 square meters)    Stage 5 End Stage CKD (GFR <15 mL/min/1.73 square meters)  Note: GFR calculation is accurate only with a steady state creatinine    Ethanol [[id number]]  (Normal) Collected: 11/30/24 0820    Lab Status: Final result Specimen: Blood from Arm, Right Updated: 11/30/24 0844     Ethanol Lvl <10 mg/dL     CBC and differential [[id number]]  (Abnormal) Collected: 11/30/24 0820    Lab Status: Final result Specimen: Blood from Arm, Right Updated: 11/30/24 0826     WBC 15.03 Thousand/uL      RBC 5.68 Million/uL      Hemoglobin 17.6 g/dL      Hematocrit 51.6 %      MCV 91 fL      MCH 31.0 pg      MCHC 34.1 g/dL       RDW 12.8 %      MPV 10.4 fL      Platelets 386 Thousands/uL      nRBC 0 /100 WBCs      Segmented % 71 %      Immature Grans % 1 %      Lymphocytes % 17 %      Monocytes % 10 %      Eosinophils Relative 0 %      Basophils Relative 1 %      Absolute Neutrophils 10.70 Thousands/µL      Absolute Immature Grans 0.07 Thousand/uL      Absolute Lymphocytes 2.62 Thousands/µL      Absolute Monocytes 1.48 Thousand/µL      Eosinophils Absolute 0.04 Thousand/µL      Basophils Absolute 0.12 Thousands/µL     UA (URINE) with reflex to Scope [[id number]]     Lab Status: No result Specimen: Urine     Rapid drug screen, urine [[id number]]     Lab Status: No result Specimen: Urine             No orders to display       Procedures    ED Medication and Procedure Management   Prior to Admission Medications   Prescriptions Last Dose Informant Patient Reported? Taking?   Ponesimod (Ponvory) 20 MG TABS   No No   Sig: Take 20 mg by mouth in the morning Start after completion of starter pack.   Patient not taking: Reported on 9/27/2024   Ponesimod Starter Pack (Ponvory Starter Pack) 3-6-6-5-6-7-8-9 & 10 MG TBPK   No No   Sig: Take 2 mg by mouth in the morning Please follow instructions of starter pack. Day 15th is when you take 20mg a day.   Vraylar 3 MG capsule   Yes No   Sig: Take 6 mg by mouth daily   Patient not taking: Reported on 9/27/2024   ergocalciferol (VITAMIN D2) 50,000 units   No No   Sig: Take 1 capsule (50,000 Units total) by mouth once a week   Patient not taking: Reported on 5/18/2023   haloperidol (HALDOL) 0.5 mg tablet   Yes No   Sig: Take by mouth in the morning Pt reported taking one pill   nicotine polacrilex (COMMIT) 4 MG lozenge   No No   Sig: Apply 1 lozenge (4 mg total) to the mouth or throat as needed for smoking cessation      Facility-Administered Medications: None     Patient's Medications   Discharge Prescriptions    No medications on file     No discharge procedures on file.  ED SEPSIS DOCUMENTATION   Time  reflects when diagnosis was documented in both MDM as applicable and the Disposition within this note       Time User Action Codes Description Comment    11/30/2024  8:13 AM Goldy Estrella Add [Z00.8] Encounter for psychological evaluation                  Goldy Estrella,   11/30/24 0931

## 2024-11-30 NOTE — ED NOTES
Patient is discharged,    AVS reviewed with patient. Personal belongings returned.  Patient changing to own clothes. Lyft ride arranged.  Patient will wait at the ER lounge for his ride.     Umm Bhatt RN  11/30/24 1814

## 2024-11-30 NOTE — Clinical Note
Milan Baig should be transferred out to [facility] and has been medically cleared.

## 2024-11-30 NOTE — ED NOTES
Initial round with patient, pacing in his room, RN told patient that a set of v/s is needed.  Patient sat in the chair and was coperative.  Virtual  sitter on going.     Umm Bhatt RN  11/30/24 0923

## 2024-12-02 ENCOUNTER — TELEPHONE (OUTPATIENT)
Age: 49
End: 2024-12-02

## 2024-12-03 NOTE — TELEPHONE ENCOUNTER
Attempted Contacting Patient regarding recent ED Visit on     Left message asking Patient to call the office to schedule Follow up appointment. Provided office hours and phone number.      ED:[facility]  CC:Psychiatric Evaluation    DX:Psychiatric Evaluation   Time:7:53am    Last OV: 5/4/23

## 2025-02-04 ENCOUNTER — TELEPHONE (OUTPATIENT)
Age: 50
End: 2025-02-04

## 2025-02-04 NOTE — TELEPHONE ENCOUNTER
Contacted patient to schedule a AWV, he declined at this time. Stated that he will call us back soon to schedule.

## 2025-04-09 ENCOUNTER — TELEPHONE (OUTPATIENT)
Age: 50
End: 2025-04-09

## 2025-04-09 NOTE — TELEPHONE ENCOUNTER
"Patient states that he only needs a doctor if he is sick. Patient does not want to be removed from [facility] but states he will not be making any appointments at this time. Advised patient on yearly check ups to which he laughed and said \"I'm good.\"  "

## 2025-05-01 ENCOUNTER — HOSPITAL ENCOUNTER (EMERGENCY)
Facility: HOSPITAL | Age: 50
Discharge: HOME | End: 2025-05-01
Attending: EMERGENCY MEDICINE
Payer: MEDICARE

## 2025-05-01 VITALS
TEMPERATURE: 99.1 F | RESPIRATION RATE: 18 BRPM | HEART RATE: 102 BPM | SYSTOLIC BLOOD PRESSURE: 145 MMHG | BODY MASS INDEX: 29.71 KG/M2 | DIASTOLIC BLOOD PRESSURE: 94 MMHG | OXYGEN SATURATION: 96 % | WEIGHT: 231.4 LBS

## 2025-05-01 DIAGNOSIS — L02.92 FURUNCLE: Primary | ICD-10-CM

## 2025-05-01 PROCEDURE — 99284 EMERGENCY DEPT VISIT MOD MDM: CPT | Performed by: EMERGENCY MEDICINE

## 2025-05-01 PROCEDURE — 99283 EMERGENCY DEPT VISIT LOW MDM: CPT

## 2025-05-01 RX ORDER — CEPHALEXIN 500 MG/1
500 CAPSULE ORAL EVERY 6 HOURS SCHEDULED
Qty: 20 CAPSULE | Refills: 0 | Status: SHIPPED | OUTPATIENT
Start: 2025-05-01 | End: 2025-05-06

## 2025-05-01 RX ORDER — CEPHALEXIN 500 MG/1
500 CAPSULE ORAL ONCE
Status: COMPLETED | OUTPATIENT
Start: 2025-05-01 | End: 2025-05-01

## 2025-05-01 RX ADMIN — CEPHALEXIN 500 MG: 500 CAPSULE ORAL at 19:14

## 2025-05-01 NOTE — DISCHARGE INSTRUCTIONS
Use the prescribed antibiotic for the next 5 days.  Continue to use it even if the area is improving.  If you have worsening redness, swelling, warmth after 2 full days of antibiotics or if at any point you develop fevers, chills, fatigue, return to the emergency department.

## 2025-05-01 NOTE — ED PROVIDER NOTES
Time reflects when diagnosis was documented in both MDM as applicable and the Disposition within this note       Time User Action Codes Description Comment    5/1/2025  7:06 PM Humphrey Leon Add [L02.92] Furuncle           ED Disposition       ED Disposition   Discharge    Condition   Stable    Date/Time   Thu May 1, 2025  7:05 PM    Comment   Milan Baig discharge to home/self care.                   Assessment & Plan       Medical Decision Making  Patient with exam consistent with furuncle.  Afebrile, denying constitutional symptoms.  No evidence of drainable abscess.  Treated with Keflex, provided with prescription for course of Keflex, reassurance, discharged with return precautions.    Risk  Prescription drug management.             Medications   cephalexin (KEFLEX) capsule 500 mg (500 mg Oral Given 5/1/25 1914)       ED Risk Strat Scores                    No data recorded        SBIRT 22yo+      Flowsheet Row Most Recent Value   Initial Alcohol Screen: US AUDIT-C     1. How often do you have a drink containing alcohol? 0 Filed at: 05/01/2025 1804   3a. Male UNDER 65: How often do you have five or more drinks on one occasion? 0 Filed at: 05/01/2025 1804   Audit-C Score 0 Filed at: 05/01/2025 1804   CARLA: How many times in the past year have you...    Used an illegal drug or used a prescription medication for non-medical reasons? Never Filed at: 05/01/2025 1804                            History of Present Illness       Chief Complaint   Patient presents with    Mass     States he started with a red bump left cheek sometime today. No itching.        Past Medical History:   Diagnosis Date    Multiple sclerosis (HCC)     Psychiatric disorder       Past Surgical History:   Procedure Laterality Date    HAND SURGERY        Family History   Problem Relation Age of Onset    Diabetes Mother     Crohn's disease Father       Social History     Tobacco Use    Smoking status: Every Day     Current  packs/day: 0.50     Types: Cigarettes    Smokeless tobacco: Former   Vaping Use    Vaping status: Never Used   Substance Use Topics    Alcohol use: No    Drug use: Not Currently     Comment: sts he quit drugs      E-Cigarette/Vaping    E-Cigarette Use Never User       E-Cigarette/Vaping Substances    Nicotine No     THC No     CBD No     Other No     Unknown No       I have reviewed and agree with the history as documented.     Patient is a 50-year-old male presenting for evaluation of facial lesion.  Patient noted a small erythematous patch on the left side of the face with a central white papule this morning.  Patient complains of mild pain in the area, denies fevers, chills, constitutional symptoms.        Review of Systems   Constitutional:  Negative for chills and fever.   Skin:  Positive for wound. Negative for color change, pallor and rash.   All other systems reviewed and are negative.          Objective       ED Triage Vitals [05/01/25 1802]   Temperature Pulse Blood Pressure Respirations SpO2 Patient Position - Orthostatic VS   99.1 °F (37.3 °C) 102 145/94 18 96 % Sitting      Temp Source Heart Rate Source BP Location FiO2 (%) Pain Score    Tympanic Monitor Left arm -- 1      Vitals      Date and Time Temp Pulse SpO2 Resp BP Pain Score FACES Pain Rating User   05/01/25 1802 99.1 °F (37.3 °C) 102 96 % 18 145/94 1 -- SW            Physical Exam  Vitals and nursing note reviewed.   Constitutional:       General: He is not in acute distress.     Appearance: Normal appearance. He is not ill-appearing, toxic-appearing or diaphoretic.   HENT:      Head: Normocephalic and atraumatic.      Comments: Approximately 1 cm area of erythema and firmness on the left cheek with central white papule consistent with a furuncle.  No fluctuance to suggest a drainable abscess     Right Ear: External ear normal.      Left Ear: External ear normal.   Eyes:      General:         Right eye: No discharge.         Left eye: No  discharge.   Pulmonary:      Effort: No respiratory distress.   Abdominal:      General: There is no distension.   Musculoskeletal:         General: No deformity.      Cervical back: Normal range of motion.   Skin:     Findings: No lesion or rash.   Neurological:      Mental Status: He is alert and oriented to person, place, and time. Mental status is at baseline.   Psychiatric:         Mood and Affect: Mood and affect normal.         Results Reviewed       None            No orders to display       Procedures    ED Medication and Procedure Management   Prior to Admission Medications   Prescriptions Last Dose Informant Patient Reported? Taking?   Ponesimod (Ponvory) 20 MG TABS   No No   Sig: Take 20 mg by mouth in the morning Start after completion of starter pack.   Patient not taking: Reported on 9/27/2024   Ponesimod Starter Pack (Ponvory Starter Pack) 4-1-2-5-6-7-8-9 & 10 MG TBPK   No No   Sig: Take 2 mg by mouth in the morning Please follow instructions of starter pack. Day 15th is when you take 20mg a day.   Vraylar 3 MG capsule   Yes No   Sig: Take 6 mg by mouth daily   Patient not taking: Reported on 9/27/2024   ergocalciferol (VITAMIN D2) 50,000 units   No No   Sig: Take 1 capsule (50,000 Units total) by mouth once a week   Patient not taking: Reported on 5/18/2023   haloperidol (HALDOL) 0.5 mg tablet   Yes No   Sig: Take by mouth in the morning Pt reported taking one pill   nicotine polacrilex (COMMIT) 4 MG lozenge   No No   Sig: Apply 1 lozenge (4 mg total) to the mouth or throat as needed for smoking cessation      Facility-Administered Medications: None     Discharge Medication List as of 5/1/2025  7:07 PM        START taking these medications    Details   cephalexin (KEFLEX) 500 mg capsule Take 1 capsule (500 mg total) by mouth every 6 (six) hours for 5 days, Starting Thu 5/1/2025, Until Tue 5/6/2025, Normal           CONTINUE these medications which have NOT CHANGED    Details   ergocalciferol  (VITAMIN D2) 50,000 units Take 1 capsule (50,000 Units total) by mouth once a week, Starting Wed 9/28/2022, Normal      haloperidol (HALDOL) 0.5 mg tablet Take by mouth in the morning Pt reported taking one pill, Historical Med      nicotine polacrilex (COMMIT) 4 MG lozenge Apply 1 lozenge (4 mg total) to the mouth or throat as needed for smoking cessation, Starting Tue 3/28/2023, Normal      Ponesimod (Ponvory) 20 MG TABS Take 20 mg by mouth in the morning Start after completion of starter pack., Starting Mon 5/22/2023, Normal      Ponesimod Starter Pack (Ponvory Starter Pack) 9-0-3-5-6-7-8-9 & 10 MG TBPK Take 2 mg by mouth in the morning Please follow instructions of starter pack. Day 15th is when you take 20mg a day., Starting Mon 5/22/2023, Normal      Vraylar 3 MG capsule Take 6 mg by mouth daily, Starting Mon 11/15/2021, Historical Med           No discharge procedures on file.  ED SEPSIS DOCUMENTATION   Time reflects when diagnosis was documented in both MDM as applicable and the Disposition within this note       Time User Action Codes Description Comment    5/1/2025  7:06 PM Humphrey Leon Add [L02.92] Luis Felipe Leon MD  05/01/25 1928

## 2025-06-20 ENCOUNTER — HOSPITAL ENCOUNTER (EMERGENCY)
Facility: HOSPITAL | Age: 50
Discharge: HOME | End: 2025-06-20
Attending: EMERGENCY MEDICINE
Payer: MEDICARE

## 2025-06-20 VITALS
SYSTOLIC BLOOD PRESSURE: 118 MMHG | RESPIRATION RATE: 18 BRPM | HEART RATE: 95 BPM | TEMPERATURE: 97.3 F | OXYGEN SATURATION: 98 % | WEIGHT: 234 LBS | BODY MASS INDEX: 30.04 KG/M2 | DIASTOLIC BLOOD PRESSURE: 74 MMHG

## 2025-06-20 DIAGNOSIS — F19.10 SUBSTANCE ABUSE (HCC): ICD-10-CM

## 2025-06-20 DIAGNOSIS — R44.0 AUDITORY HALLUCINATIONS: Primary | ICD-10-CM

## 2025-06-20 LAB
ALBUMIN SERPL BCG-MCNC: 4.2 G/DL (ref 3.5–5)
ALP SERPL-CCNC: 64 U/L (ref 34–104)
ALT SERPL W P-5'-P-CCNC: 27 U/L (ref 7–52)
ANION GAP SERPL CALCULATED.3IONS-SCNC: 11 MMOL/L (ref 4–13)
AST SERPL W P-5'-P-CCNC: 31 U/L (ref 13–39)
BASOPHILS # BLD AUTO: 0.07 THOUSANDS/ÂΜL (ref 0–0.1)
BASOPHILS NFR BLD AUTO: 1 % (ref 0–1)
BILIRUB SERPL-MCNC: 0.76 MG/DL (ref 0.2–1)
BUN SERPL-MCNC: 37 MG/DL (ref 5–25)
CALCIUM SERPL-MCNC: 8.9 MG/DL (ref 8.4–10.2)
CHLORIDE SERPL-SCNC: 105 MMOL/L (ref 96–108)
CK SERPL-CCNC: 832 U/L (ref 39–308)
CO2 SERPL-SCNC: 20 MMOL/L (ref 21–32)
CREAT SERPL-MCNC: 2.04 MG/DL (ref 0.6–1.3)
EOSINOPHIL # BLD AUTO: 0.05 THOUSAND/ÂΜL (ref 0–0.61)
EOSINOPHIL NFR BLD AUTO: 0 % (ref 0–6)
ERYTHROCYTE [DISTWIDTH] IN BLOOD BY AUTOMATED COUNT: 13.2 % (ref 11.6–15.1)
ETHANOL SERPL-MCNC: <10 MG/DL
GFR SERPL CREATININE-BSD FRML MDRD: 36 ML/MIN/1.73SQ M
GLUCOSE SERPL-MCNC: 108 MG/DL (ref 65–140)
HCT VFR BLD AUTO: 48.9 % (ref 36.5–49.3)
HGB BLD-MCNC: 16.7 G/DL (ref 12–17)
IMM GRANULOCYTES # BLD AUTO: 0.03 THOUSAND/UL (ref 0–0.2)
IMM GRANULOCYTES NFR BLD AUTO: 0 % (ref 0–2)
LYMPHOCYTES # BLD AUTO: 2.02 THOUSANDS/ÂΜL (ref 0.6–4.47)
LYMPHOCYTES NFR BLD AUTO: 18 % (ref 14–44)
MCH RBC QN AUTO: 30.6 PG (ref 26.8–34.3)
MCHC RBC AUTO-ENTMCNC: 34.2 G/DL (ref 31.4–37.4)
MCV RBC AUTO: 90 FL (ref 82–98)
MONOCYTES # BLD AUTO: 1.23 THOUSAND/ÂΜL (ref 0.17–1.22)
MONOCYTES NFR BLD AUTO: 11 % (ref 4–12)
NEUTROPHILS # BLD AUTO: 7.97 THOUSANDS/ÂΜL (ref 1.85–7.62)
NEUTS SEG NFR BLD AUTO: 70 % (ref 43–75)
NRBC BLD AUTO-RTO: 0 /100 WBCS
PLATELET # BLD AUTO: 329 THOUSANDS/UL (ref 149–390)
PMV BLD AUTO: 10.4 FL (ref 8.9–12.7)
POTASSIUM SERPL-SCNC: 3.8 MMOL/L (ref 3.5–5.3)
PROT SERPL-MCNC: 7.2 G/DL (ref 6.4–8.4)
RBC # BLD AUTO: 5.46 MILLION/UL (ref 3.88–5.62)
SODIUM SERPL-SCNC: 136 MMOL/L (ref 135–147)
WBC # BLD AUTO: 11.37 THOUSAND/UL (ref 4.31–10.16)

## 2025-06-20 PROCEDURE — 96360 HYDRATION IV INFUSION INIT: CPT

## 2025-06-20 PROCEDURE — 80053 COMPREHEN METABOLIC PANEL: CPT | Performed by: EMERGENCY MEDICINE

## 2025-06-20 PROCEDURE — 93005 ELECTROCARDIOGRAM TRACING: CPT

## 2025-06-20 PROCEDURE — 99285 EMERGENCY DEPT VISIT HI MDM: CPT | Performed by: EMERGENCY MEDICINE

## 2025-06-20 PROCEDURE — 82077 ASSAY SPEC XCP UR&BREATH IA: CPT | Performed by: EMERGENCY MEDICINE

## 2025-06-20 PROCEDURE — 85025 COMPLETE CBC W/AUTO DIFF WBC: CPT | Performed by: EMERGENCY MEDICINE

## 2025-06-20 PROCEDURE — 82550 ASSAY OF CK (CPK): CPT | Performed by: EMERGENCY MEDICINE

## 2025-06-20 PROCEDURE — 99285 EMERGENCY DEPT VISIT HI MDM: CPT

## 2025-06-20 PROCEDURE — 36415 COLL VENOUS BLD VENIPUNCTURE: CPT | Performed by: EMERGENCY MEDICINE

## 2025-06-20 RX ADMIN — SODIUM CHLORIDE 1000 ML: 0.9 INJECTION, SOLUTION INTRAVENOUS at 16:50

## 2025-06-20 NOTE — ED PROVIDER NOTES
Time reflects when diagnosis was documented in both MDM as applicable and the Disposition within this note       Time User Action Codes Description Comment    6/20/2025  6:17 PM Belinda Estrada Add [R44.0] Auditory hallucinations     6/20/2025  6:17 PM Belinda Estrada Add [F19.10] Substance abuse (HCC)           ED Disposition       ED Disposition   Discharge    Condition   Stable    Date/Time   Fri Jun 20, 2025  8:53 PM    Comment   Milan Baig discharge to home/self care.                   Assessment & Plan       Medical Decision Making  Pt. Tachycardic and diaphoretic (it is warm out today), no pain or sob or fever.  Will give IVF and check screening labs and have crisis speak with pt.  1700 - pt. Sleeping and snoring in ER.  Discussed with crisis.  Labs pending.  1745 - labs and prior records reviewed.  Pt. With mild rhabdo, , similar to prior visits, felt to be due to meth use.  Cr. Tonight 2.04, Has run from 1.7-2.8 over the past year.  Pt. Is getting IVF and still resting/sleeping.  2100 - pt. Slept here for 5 hours, feels better, ambulatory to bathroom.  He is ready to go home.  Will discharge.  Advised no drugs, keep hydrated.    Amount and/or Complexity of Data Reviewed  Labs: ordered.             Medications   sodium chloride 0.9 % bolus 1,000 mL (0 mL Intravenous Stopped 6/20/25 1820)       ED Risk Strat Scores                    No data recorded        SBIRT 22yo+      Flowsheet Row Most Recent Value   Initial Alcohol Screen: US AUDIT-C     1. How often do you have a drink containing alcohol? 0 Filed at: 06/20/2025 1604   3a. Male UNDER 65: How often do you have five or more drinks on one occasion? 0 Filed at: 06/20/2025 1604   Audit-C Score 0 Filed at: 06/20/2025 1604   CARLA: How many times in the past year have you...    Used an illegal drug or used a prescription medication for non-medical reasons? Weekly Filed at: 06/20/2025 1604   DAST-10: In the past 12 months...    1. Have you  "used drugs other than those required for medical reasons? 1 Filed at: 06/20/2025 1604   2. Do you use more than one drug at a time? 0 Filed at: 06/20/2025 1604   3. Have you had medical problems as a result of your drug use (e.g., memory loss, hepatitis, convulsions, bleeding, etc.)? 0 Filed at: 06/20/2025 1604   4. Have you had \"blackouts\" or \"flashbacks\" as a result of drug use?YesNo 0 Filed at: 06/20/2025 1604   5. Do you ever feel bad or guilty about your drug use? 0 Filed at: 06/20/2025 1604   6. Does your spouse (or parent) ever complain about your involvement with drugs? 0 Filed at: 06/20/2025 1604   7. Have you neglected your family because of your use of drugs? 0 Filed at: 06/20/2025 1604   8. Have you engaged in illegal activities in order to obtain drugs? 0 Filed at: 06/20/2025 1604   9. Have you ever experienced withdrawal symptoms (felt sick) when you stopped taking drugs? 0 Filed at: 06/20/2025 1604   10. Are you always able to stop using drugs when you want to? 0 Filed at: 06/20/2025 1604   DAST-10 Score 1 Filed at: 06/20/2025 1604                            History of Present Illness       Chief Complaint   Patient presents with    Psychiatric Evaluation     Patient states he wanted help with mental issues because he is now hallucinating more and thinks that his medication should be adjusted       Past Medical History[1]   Past Surgical History[2]   Family History[3]   Social History[4]   E-Cigarette/Vaping    E-Cigarette Use Never User       E-Cigarette/Vaping Substances    Nicotine No     THC No     CBD No     Other No     Unknown No       I have reviewed and agree with the history as documented.     51 yo male with history of schizophrenia and meth use says he is hearing voices and wanted to be in a safe place so he came to ER.  However he denies that voices are telling him to hurt himself or anyone else.  He denies feeling depressed.  He denies that he wants to hurt himself.  He does not want to " be admitted to [facility], he just wants to rest here and something to eat.  He denies pain anywhere, no injury.  No fever, vomiting, diarrhea.  He is talking his Haldol.      History provided by:  Patient   used: No    Psychiatric Evaluation  Associated symptoms: no chest pain        Review of Systems   Constitutional:  Negative for fever.   Respiratory:  Negative for cough and shortness of breath.    Cardiovascular:  Negative for chest pain.   Gastrointestinal:  Negative for diarrhea and vomiting.   Skin:  Negative for rash.           Objective       ED Triage Vitals [06/20/25 1559]   Temperature Pulse Blood Pressure Respirations SpO2 Patient Position - Orthostatic VS   (!) 97.3 °F (36.3 °C) (!) 126 136/83 (!) 24 95 % Sitting      Temp Source Heart Rate Source BP Location FiO2 (%) Pain Score    Temporal Monitor Right arm -- No Pain      Vitals      Date and Time Temp Pulse SpO2 Resp BP Pain Score FACES Pain Rating User   06/20/25 1853 -- 95 98 % 18 118/74 -- -- [facility]   06/20/25 1800 -- 100 99 % 20 116/77 -- -- [facility]   06/20/25 1730 -- 104 98 % 18 118/74 -- -- [facility]   06/20/25 1654 -- 113 99 % 20 119/78 -- -- [facility]   06/20/25 1559 97.3 °F (36.3 °C) 126 95 % 24 136/83 No Pain -- MDD            Physical Exam  Vitals and nursing note reviewed.   Constitutional:       General: He is not in acute distress.     Appearance: He is well-developed. He is diaphoretic. He is not ill-appearing.   HENT:      Head: Normocephalic and atraumatic.     Eyes:      General: No scleral icterus.     Conjunctiva/sclera: Conjunctivae normal.       Cardiovascular:      Rate and Rhythm: Regular rhythm. Tachycardia present.      Heart sounds: Normal heart sounds. No murmur heard.  Pulmonary:      Effort: Pulmonary effort is normal. No respiratory distress.      Breath sounds: Normal breath sounds.   Abdominal:      General: Bowel sounds are normal. There is no distension.      Palpations: Abdomen is soft.      Tenderness: There is no  abdominal tenderness.     Musculoskeletal:         General: No deformity. Normal range of motion.      Cervical back: Normal range of motion and neck supple.      Right lower leg: No edema.      Left lower leg: No edema.     Skin:     General: Skin is warm.      Coloration: Skin is not pale.      Findings: Lesion present. No erythema or rash.      Comments: Scabbed wounds scattered on lower ext.     Neurological:      General: No focal deficit present.      Mental Status: He is alert and oriented to person, place, and time.      Cranial Nerves: No cranial nerve deficit.     Psychiatric:         Mood and Affect: Mood normal.         Behavior: Behavior normal.         Results Reviewed       Procedure Component Value Units Date/Time    Ethanol [[id number]]  (Normal) Collected: 06/20/25 1649    Lab Status: Final result Specimen: Blood from Arm, Left Updated: 06/20/25 1712     Ethanol Lvl <10 mg/dL     Comprehensive metabolic panel [[id number]]  (Abnormal) Collected: 06/20/25 1649    Lab Status: Final result Specimen: Blood from Arm, Left Updated: 06/20/25 1712     Sodium 136 mmol/L      Potassium 3.8 mmol/L      Chloride 105 mmol/L      CO2 20 mmol/L      ANION GAP 11 mmol/L      BUN 37 mg/dL      Creatinine 2.04 mg/dL      Glucose 108 mg/dL      Calcium 8.9 mg/dL      AST 31 U/L      ALT 27 U/L      Alkaline Phosphatase 64 U/L      Total Protein 7.2 g/dL      Albumin 4.2 g/dL      Total Bilirubin 0.76 mg/dL      eGFR 36 ml/min/1.73sq m     Narrative:      [employer] guidelines for Chronic Kidney Disease (CKD):     Stage 1 with normal or high GFR (GFR > 90 mL/min/1.73 square meters)    Stage 2 Mild CKD (GFR = 60-89 mL/min/1.73 square meters)    Stage 3A Moderate CKD (GFR = 45-59 mL/min/1.73 square meters)    Stage 3B Moderate CKD (GFR = 30-44 mL/min/1.73 square meters)    Stage 4 Severe CKD (GFR = 15-29 mL/min/1.73 square meters)    Stage 5 End Stage CKD (GFR <15 mL/min/1.73 square meters)  Note:  GFR calculation is accurate only with a steady state creatinine    CK [[id number]]  (Abnormal) Collected: 06/20/25 1649    Lab Status: Final result Specimen: Blood from Arm, Left Updated: 06/20/25 1712     Total  U/L     CBC and differential [[id number]]  (Abnormal) Collected: 06/20/25 1649    Lab Status: Final result Specimen: Blood from Arm, Left Updated: 06/20/25 1656     WBC 11.37 Thousand/uL      RBC 5.46 Million/uL      Hemoglobin 16.7 g/dL      Hematocrit 48.9 %      MCV 90 fL      MCH 30.6 pg      MCHC 34.2 g/dL      RDW 13.2 %      MPV 10.4 fL      Platelets 329 Thousands/uL      nRBC 0 /100 WBCs      Segmented % 70 %      Immature Grans % 0 %      Lymphocytes % 18 %      Monocytes % 11 %      Eosinophils Relative 0 %      Basophils Relative 1 %      Absolute Neutrophils 7.97 Thousands/µL      Absolute Immature Grans 0.03 Thousand/uL      Absolute Lymphocytes 2.02 Thousands/µL      Absolute Monocytes 1.23 Thousand/µL      Eosinophils Absolute 0.05 Thousand/µL      Basophils Absolute 0.07 Thousands/µL     UA (URINE) with reflex to Scope [[id number]]     Lab Status: No result Specimen: Urine     Rapid drug screen, urine [[id number]]     Lab Status: No result Specimen: Urine             No orders to display       ECG 12 Lead Documentation Only    Date/Time: 6/20/2025 4:40 PM    Performed by: Belinda Estrada MD  Authorized by: Belinda Estrada MD    Indications / Diagnosis:  [facility] eval, tachycardic  ECG reviewed by me, the ED Provider: yes    Patient location:  ED  Previous ECG:     Previous ECG:  Unavailable  Interpretation:     Interpretation: abnormal    Rate:     ECG rate:  114    ECG rate assessment: tachycardic    Rhythm:     Rhythm: sinus tachycardia    Ectopy:     Ectopy: none    QRS:     QRS axis:  Normal  Conduction:     Conduction: normal    ST segments:     ST segments:  Normal  T waves:     T waves: normal    Q waves:     Q waves:  V1 and V2  Other findings:     Other findings: LAE        ED  Medication and Procedure Management   Prior to Admission Medications   Prescriptions Last Dose Informant Patient Reported? Taking?   Ponesimod (Ponvory) 20 MG TABS   No No   Sig: Take 20 mg by mouth in the morning Start after completion of starter pack.   Patient not taking: Reported on 9/27/2024   Ponesimod Starter Pack (Ponvory Starter Pack) 5-2-7-5-6-7-8-9 & 10 MG TBPK   No No   Sig: Take 2 mg by mouth in the morning Please follow instructions of starter pack. Day 15th is when you take 20mg a day.   Vraylar 3 MG capsule   Yes No   Sig: Take 6 mg by mouth daily   Patient not taking: Reported on 9/27/2024   ergocalciferol (VITAMIN D2) 50,000 units   No No   Sig: Take 1 capsule (50,000 Units total) by mouth once a week   Patient not taking: Reported on 5/18/2023   haloperidol (HALDOL) 0.5 mg tablet   Yes No   Sig: Take by mouth in the morning Pt reported taking one pill   nicotine polacrilex (COMMIT) 4 MG lozenge   No No   Sig: Apply 1 lozenge (4 mg total) to the mouth or throat as needed for smoking cessation      Facility-Administered Medications: None     Patient's Medications   Discharge Prescriptions    No medications on file     No discharge procedures on file.  ED SEPSIS DOCUMENTATION   Time reflects when diagnosis was documented in both MDM as applicable and the Disposition within this note       Time User Action Codes Description Comment    6/20/2025  6:17 PM Belinda Estrada Add [R44.0] Auditory hallucinations     6/20/2025  6:17 PM Belinda Estrada Add [F19.10] Substance abuse (HCC)                      [1]   Past Medical History:  Diagnosis Date    Multiple sclerosis (HCC)     Psychiatric disorder    [2]   Past Surgical History:  Procedure Laterality Date    HAND SURGERY     [3]   Family History  Problem Relation Name Age of Onset    Diabetes Mother      Crohn's disease Father     [4]   Social History  Tobacco Use    Smoking status: Every Day     Current packs/day: 0.50     Types: Cigarettes    Smokeless  tobacco: Former   Vaping Use    Vaping status: Never Used   Substance Use Topics    Alcohol use: No    Drug use: Not Currently     Comment: sts he quit drugs        Belinda Estrada MD  06/20/25 1938

## 2025-06-21 NOTE — DISCHARGE INSTRUCTIONS
Rest at home.  Keep hydrated with fluids.  Don't use meth or other illegal drugs.  Return to the ER if any urgent concerns or worsening or you want help or feel unsafe.

## 2025-06-22 LAB
ATRIAL RATE: 114 BPM
P AXIS: 53 DEGREES
PR INTERVAL: 154 MS
QRS AXIS: 44 DEGREES
QRSD INTERVAL: 78 MS
QT INTERVAL: 338 MS
QTC INTERVAL: 466 MS
T WAVE AXIS: 32 DEGREES
VENTRICULAR RATE: 114 BPM

## 2025-06-22 PROCEDURE — 93010 ELECTROCARDIOGRAM REPORT: CPT | Performed by: INTERNAL MEDICINE
